# Patient Record
Sex: FEMALE | Race: WHITE | NOT HISPANIC OR LATINO | Employment: FULL TIME | URBAN - METROPOLITAN AREA
[De-identification: names, ages, dates, MRNs, and addresses within clinical notes are randomized per-mention and may not be internally consistent; named-entity substitution may affect disease eponyms.]

---

## 2019-01-23 ENCOUNTER — OFFICE VISIT CONVERTED (OUTPATIENT)
Dept: ORTHOPEDIC SURGERY | Facility: CLINIC | Age: 24
End: 2019-01-23
Attending: ORTHOPAEDIC SURGERY

## 2019-05-25 ENCOUNTER — HOSPITAL ENCOUNTER (OUTPATIENT)
Dept: URGENT CARE | Facility: CLINIC | Age: 24
Discharge: HOME | End: 2019-05-25
Attending: NURSE PRACTITIONER

## 2019-07-11 ENCOUNTER — HOSPITAL ENCOUNTER (OUTPATIENT)
Dept: OTHER | Facility: HOSPITAL | Age: 24
Discharge: HOME | End: 2019-07-11
Attending: NURSE PRACTITIONER

## 2019-07-14 LAB
AMOXICILLIN+CLAV SUSC ISLT: 16
AMPICILLIN SUSC ISLT: >=32
AMPICILLIN+SULBAC SUSC ISLT: >=32
BACTERIA UR CULT: ABNORMAL
CEFAZOLIN SUSC ISLT: <=4
CEFEPIME SUSC ISLT: <=1
CEFTAZIDIME SUSC ISLT: <=1
CEFTRIAXONE SUSC ISLT: <=1
CEFUROXIME ORAL SUSC ISLT: 4
CEFUROXIME PARENTER SUSC ISLT: 4
CIPROFLOXACIN SUSC ISLT: <=0.25
ERTAPENEM SUSC ISLT: <=0.5
GENTAMICIN SUSC ISLT: >=16
LEVOFLOXACIN SUSC ISLT: <=0.12
NITROFURANTOIN SUSC ISLT: <=16
TETRACYCLINE SUSC ISLT: <=1
TMP SMX SUSC ISLT: >=320
TOBRAMYCIN SUSC ISLT: 4

## 2019-11-08 ENCOUNTER — HOSPITAL ENCOUNTER (OUTPATIENT)
Dept: OTHER | Facility: HOSPITAL | Age: 24
Discharge: HOME | End: 2019-11-08
Attending: OBSTETRICS & GYNECOLOGY

## 2019-11-08 LAB
AMYLASE SERPL-CCNC: 61 U/L (ref 30–110)
LIPASE SERPL-CCNC: 22 U/L (ref 5–51)

## 2020-03-13 ENCOUNTER — HOSPITAL ENCOUNTER (OUTPATIENT)
Dept: URGENT CARE | Facility: CLINIC | Age: 25
Discharge: HOME | End: 2020-03-13
Attending: FAMILY MEDICINE

## 2020-03-15 LAB — BACTERIA SPEC AEROBE CULT: NORMAL

## 2020-06-26 ENCOUNTER — HOSPITAL ENCOUNTER (OUTPATIENT)
Dept: OTHER | Facility: HOSPITAL | Age: 25
Discharge: HOME | End: 2020-06-26
Attending: NURSE PRACTITIONER

## 2020-06-28 LAB
BACTERIA SPEC AEROBE CULT: NORMAL
BACTERIA SPEC ANAEROBE CULT: NORMAL

## 2020-10-26 ENCOUNTER — OFFICE VISIT (OUTPATIENT)
Dept: GASTROENTEROLOGY | Facility: CLINIC | Age: 25
End: 2020-10-26

## 2020-10-26 VITALS
BODY MASS INDEX: 25.33 KG/M2 | DIASTOLIC BLOOD PRESSURE: 88 MMHG | OXYGEN SATURATION: 99 % | WEIGHT: 152 LBS | HEIGHT: 65 IN | HEART RATE: 77 BPM | SYSTOLIC BLOOD PRESSURE: 130 MMHG | TEMPERATURE: 97.5 F

## 2020-10-26 DIAGNOSIS — R19.7 DIARRHEA, UNSPECIFIED TYPE: ICD-10-CM

## 2020-10-26 DIAGNOSIS — K51.00 ULCERATIVE PANCOLITIS WITHOUT COMPLICATION (HCC): Primary | ICD-10-CM

## 2020-10-26 DIAGNOSIS — R10.84 GENERALIZED ABDOMINAL PAIN: ICD-10-CM

## 2020-10-26 PROCEDURE — 99244 OFF/OP CNSLTJ NEW/EST MOD 40: CPT | Performed by: INTERNAL MEDICINE

## 2020-10-26 RX ORDER — BUSPIRONE HYDROCHLORIDE 15 MG/1
15 TABLET ORAL DAILY
COMMUNITY
Start: 2020-09-07

## 2020-10-26 RX ORDER — CITALOPRAM 40 MG/1
40 TABLET ORAL DAILY
COMMUNITY
Start: 2020-08-26

## 2020-10-26 NOTE — PATIENT INSTRUCTIONS
Obtain copy of records from Dr. Akins.    Check lab work today.    Schedule CT enterography for further evaluation of symptoms.    Further recommendations to be made regarding treatment pending review of records and results of above.

## 2020-10-26 NOTE — PROGRESS NOTES
Ulcerative Colitis (poss Crohns ) and Establish Care      HPI  Patient is a 25 year old female who presents today for evaluation.    Patient presents today for a second opinion. She has been following with Dr. Akins.    She has a history of ulcerative colitis. Prior treatment included Humira, Remicade, and sulfasalazine.    Patient reports she was diagnosed January 2012 during pregnancy. She had a colonoscopy during her pregnancy and reports her disease was severe. She was initially treated with Remicade which she reports worked very well for her. It was discontinued due to an insurance issue. She then took sulfasalazine which did not help. She then started Humira which she reports helped for a period of time but after around a year it was no longer working. She had also previously been on prednisone and 6MP.    She reports she had a colonoscopy performed over the summer 2020 as part of a clinical trial and planned to start a clinical trial drug. She reports when the colonoscopy was performed, she was told she had Crohn's disease. She had been told her colon looked better but there was now inflammation in the small intestine. She did not start the clinical trial as she was told it was not bad enough. Patient was told she could stay on Humira, but patient did not want to continue this therapy as it did not seem to be helping. She reports alternative therapy was not discussed.    Currently, she is on no medications. She reports she has not been on therapy for around 1 year.    Patient reports her symptoms come and go. She reports increased stool frequency with at least 5 bowel movements per day. Reports mucus in her stools. Reports abdominal pain that radiates to her back.     Review of Systems   Constitutional: Negative for appetite change, chills, diaphoresis, fatigue, fever and unexpected weight change.   HENT: Negative for dental problem, ear pain, mouth sores, rhinorrhea, sore throat and voice change.     Eyes: Negative for pain, redness and visual disturbance.   Respiratory: Negative for cough, chest tightness and wheezing.    Cardiovascular: Negative for chest pain, palpitations and leg swelling.   Endocrine: Negative for cold intolerance, heat intolerance, polydipsia, polyphagia and polyuria.   Genitourinary: Negative for dysuria, frequency, hematuria and urgency.   Musculoskeletal: Positive for arthralgias, back pain, joint swelling, myalgias and neck pain.   Skin: Negative for rash.   Allergic/Immunologic: Negative for environmental allergies, food allergies and immunocompromised state.   Neurological: Negative for dizziness, seizures, weakness, numbness and headaches.   Hematological: Does not bruise/bleed easily.   Psychiatric/Behavioral: Positive for sleep disturbance. The patient is nervous/anxious.         I have reviewed and confirmed the accuracy of the HPI and ROS as documented by the APRN JUANITA Quinteros     Problem List:  There is no problem list on file for this patient.      Medical History:    Past Medical History:   Diagnosis Date   • Anxiety and depression         Social History:    Social History     Socioeconomic History   • Marital status:      Spouse name: Not on file   • Number of children: Not on file   • Years of education: Not on file   • Highest education level: Not on file   Tobacco Use   • Smoking status: Former Smoker   • Smokeless tobacco: Never Used   Substance and Sexual Activity   • Alcohol use: Yes   • Drug use: Never   • Sexual activity: Defer       Family History:   Family History   Problem Relation Age of Onset   • Fibromyalgia Mother    • Seizures Mother    • Diabetes Mother    • Hyperlipidemia Mother        Surgical History:   Past Surgical History:   Procedure Laterality Date   •  SECTION     • KNEE SURGERY Left          Current Outpatient Medications:   •  busPIRone (BUSPAR) 15 MG tablet, Take 15 mg by mouth Daily., Disp: , Rfl:   •  citalopram  (CeleXA) 40 MG tablet, Take 40 mg by mouth Daily., Disp: , Rfl:     Allergies: No Known Allergies     The following portions of the patient's history were reviewed and updated as appropriate: allergies, current medications, past family history, past medical history, past social history, past surgical history and problem list.    Vitals:    10/26/20 1317   BP: 130/88   Pulse: 77   Temp: 97.5 °F (36.4 °C)   SpO2: 99%         10/26/20  1317   Weight: 68.9 kg (152 lb)     Body mass index is 25.29 kg/m².      PHYSICAL EXAM:  Physical Exam  Vitals signs reviewed.   Constitutional:       Appearance: Normal appearance.   HENT:      Nose: No nasal deformity.   Eyes:      General: No scleral icterus.  Neck:      Comments: Trachea midline.  Cardiovascular:      Rate and Rhythm: Normal rate and regular rhythm.   Pulmonary:      Effort: No respiratory distress.      Breath sounds: Normal breath sounds.   Abdominal:      General: Bowel sounds are normal. There is no distension.      Palpations: Abdomen is soft. There is no mass.      Tenderness: There is no abdominal tenderness.      Comments: Mild epigastric tenderness   Lymphadenopathy:      Comments: No periumbilical lymphadenopathy.   Skin:     General: Skin is warm.   Neurological:      Mental Status: She is alert.           Assessment/ Plan  Diagnoses and all orders for this visit:    1. Ulcerative pancolitis without complication (CMS/HCC) (Primary)    2. Generalized abdominal pain    3. Diarrhea, unspecified type         No follow-ups on file.    Patient Instructions   Obtain copy of records from Dr. Akins.    Check lab work today.    Schedule CT enterography for further evaluation of symptoms.    Further recommendations to be made regarding treatment pending review of records and results of above.        Discussion:  Patient is current self-pay. Discussed with her today that I would recommend lab work, CT scan, possible updated endoscopic evaluation, and therapy for  inflammatory bowel disease. She has recently found a job and hopes to have insurance soon, she may elect to delay our workup until she has insurance again.    Once records have been reviewed and imaging have been completed, will provide further recommendations, to consider treatment with Stelara.    Documentation by Grecia GRANT acting as a scribe in the following sections on behalf of the billable provider: HPI, ROS, assessment, & plan.

## 2020-11-19 ENCOUNTER — TELEPHONE (OUTPATIENT)
Dept: GASTROENTEROLOGY | Facility: CLINIC | Age: 25
End: 2020-11-19

## 2020-11-19 RX ORDER — METHYLPREDNISOLONE 4 MG/1
TABLET ORAL
Qty: 1 EACH | Refills: 0 | Status: SHIPPED | OUTPATIENT
Start: 2020-11-19 | End: 2021-01-12 | Stop reason: SDUPTHER

## 2021-01-12 DIAGNOSIS — K51.00 ULCERATIVE PANCOLITIS WITHOUT COMPLICATION (HCC): Primary | ICD-10-CM

## 2021-01-12 RX ORDER — METHYLPREDNISOLONE 4 MG/1
TABLET ORAL
Qty: 1 EACH | Refills: 0 | Status: SHIPPED | OUTPATIENT
Start: 2021-01-12 | End: 2021-01-30 | Stop reason: HOSPADM

## 2021-01-12 NOTE — TELEPHONE ENCOUNTER
Patient called stating that she is having a flare up. She states that this has been going on for two days. She states that she has been going to the restroom non stop. She states that she is having abdominal pain. She states that her stool is all blood and mucus. She states that she took pictures to show this. She states that she tried taking the Tylenol to help with the abdominal pain. She states that she has taking Imodium but that hasn't helped.   Tiffany

## 2021-01-12 NOTE — TELEPHONE ENCOUNTER
Spoke with patient via telephone.  She has been having blood per rectum for the past 4 weeks.  She has had worsening pain and increased frequency.  We will start Medrol Dosepak.  She does not have insurance and would like to pay cash for procedures.  Will discuss with Dr. Hull the priority of procedures to help minimize out-of-pocket financial cost and contact patient with further recommendations.  Marcello

## 2021-01-13 ENCOUNTER — PREP FOR SURGERY (OUTPATIENT)
Dept: GASTROENTEROLOGY | Facility: CLINIC | Age: 26
End: 2021-01-13

## 2021-01-13 DIAGNOSIS — K51.00 ULCERATIVE PANCOLITIS WITHOUT COMPLICATION (HCC): Primary | ICD-10-CM

## 2021-01-13 DIAGNOSIS — R10.84 GENERALIZED ABDOMINAL PAIN: ICD-10-CM

## 2021-01-13 DIAGNOSIS — K62.5 RECTAL BLEEDING: ICD-10-CM

## 2021-01-13 NOTE — TELEPHONE ENCOUNTER
Brian,   I reviewed with Dr Chowdhury.   Given how poorly she is feeling and the cost of her work up, he recommends follow up with GI clinic at  to get her feeling better and due to lack of insurance   ALSO she should go to the ER at  if symptoms are unbearable as I discussed with her last night.   Please discuss with patient and let me know if we need help arranging GI appt with .   Marcello

## 2021-01-13 NOTE — TELEPHONE ENCOUNTER
Dr. Chowdhury, this was a new patient with you October 2020.    Please see message below.    Patient does not have insurance and will cash pay for procedures or testing or imaging.    She would like to know which test you want as she is not doing well from a GI standpoint.  Please advise.  Marcello

## 2021-01-14 ENCOUNTER — TELEPHONE (OUTPATIENT)
Dept: GASTROENTEROLOGY | Facility: CLINIC | Age: 26
End: 2021-01-14

## 2021-01-14 RX ORDER — PREDNISONE 10 MG/1
TABLET ORAL
Qty: 84 TABLET | Refills: 0 | Status: SHIPPED | OUTPATIENT
Start: 2021-01-14 | End: 2021-01-30 | Stop reason: HOSPADM

## 2021-01-23 ENCOUNTER — TELEPHONE (OUTPATIENT)
Dept: GASTROENTEROLOGY | Facility: CLINIC | Age: 26
End: 2021-01-23

## 2021-01-23 NOTE — TELEPHONE ENCOUNTER
Called into answering service.     Tapering steroid course currently.    Says having frequent BMs, blood, pain.     Advised to ER for flare failing oral steroids.    She understands the plan and will do so

## 2021-01-25 NOTE — TELEPHONE ENCOUNTER
Discussed with Dr Chowdhury, please work in to his schedule January 26, 2021 at 1130 for work in appt with Dr Chowdhury. SW

## 2021-01-26 ENCOUNTER — HOSPITAL ENCOUNTER (INPATIENT)
Facility: HOSPITAL | Age: 26
LOS: 4 days | Discharge: HOME | End: 2021-01-30
Attending: HOSPITALIST | Admitting: HOSPITALIST

## 2021-01-26 ENCOUNTER — OFFICE VISIT (OUTPATIENT)
Dept: GASTROENTEROLOGY | Facility: CLINIC | Age: 26
End: 2021-01-26

## 2021-01-26 VITALS
HEART RATE: 75 BPM | OXYGEN SATURATION: 95 % | TEMPERATURE: 97.7 F | HEIGHT: 65 IN | WEIGHT: 143.6 LBS | DIASTOLIC BLOOD PRESSURE: 80 MMHG | BODY MASS INDEX: 23.93 KG/M2 | SYSTOLIC BLOOD PRESSURE: 122 MMHG

## 2021-01-26 DIAGNOSIS — K62.5 BLOOD PER RECTUM: ICD-10-CM

## 2021-01-26 DIAGNOSIS — K51.00 ULCERATIVE PANCOLITIS WITHOUT COMPLICATION (HCC): Primary | ICD-10-CM

## 2021-01-26 DIAGNOSIS — R10.84 GENERALIZED ABDOMINAL PAIN: ICD-10-CM

## 2021-01-26 DIAGNOSIS — Z92.241 HISTORY OF RECENT STEROID USE: ICD-10-CM

## 2021-01-26 DIAGNOSIS — R10.84 GENERALIZED ABDOMINAL PAIN: Primary | ICD-10-CM

## 2021-01-26 DIAGNOSIS — K52.9 INFLAMMATORY BOWEL DISEASE: ICD-10-CM

## 2021-01-26 PROBLEM — E87.6 HYPOKALEMIA: Status: ACTIVE | Noted: 2021-01-26

## 2021-01-26 PROBLEM — K50.90 INFLAMMATORY BOWEL DISEASE (CROHN'S DISEASE): Status: ACTIVE | Noted: 2021-01-26

## 2021-01-26 PROBLEM — E61.1 IRON DEFICIENCY: Status: ACTIVE | Noted: 2021-01-26

## 2021-01-26 LAB
ALBUMIN SERPL-MCNC: 4.1 G/DL (ref 3.5–5.2)
ALBUMIN/GLOB SERPL: 1.6 G/DL
ALP SERPL-CCNC: 40 U/L (ref 39–117)
ALT SERPL W P-5'-P-CCNC: 11 U/L (ref 1–33)
ANION GAP SERPL CALCULATED.3IONS-SCNC: 13.4 MMOL/L (ref 5–15)
AST SERPL-CCNC: 12 U/L (ref 1–32)
B-HCG UR QL: NEGATIVE
BASOPHILS # BLD AUTO: 0.04 10*3/MM3 (ref 0–0.2)
BASOPHILS NFR BLD AUTO: 0.4 % (ref 0–1.5)
BILIRUB SERPL-MCNC: 0.2 MG/DL (ref 0–1.2)
BUN SERPL-MCNC: 7 MG/DL (ref 6–20)
BUN/CREAT SERPL: 9.5 (ref 7–25)
CALCIUM SPEC-SCNC: 8.9 MG/DL (ref 8.6–10.5)
CHLORIDE SERPL-SCNC: 105 MMOL/L (ref 98–107)
CO2 SERPL-SCNC: 23.6 MMOL/L (ref 22–29)
CREAT SERPL-MCNC: 0.74 MG/DL (ref 0.57–1)
DEPRECATED RDW RBC AUTO: 42 FL (ref 37–54)
EOSINOPHIL # BLD AUTO: 0.04 10*3/MM3 (ref 0–0.4)
EOSINOPHIL NFR BLD AUTO: 0.4 % (ref 0.3–6.2)
ERYTHROCYTE [DISTWIDTH] IN BLOOD BY AUTOMATED COUNT: 12.4 % (ref 12.3–15.4)
GFR SERPL CREATININE-BSD FRML MDRD: 96 ML/MIN/1.73
GLOBULIN UR ELPH-MCNC: 2.6 GM/DL
GLUCOSE SERPL-MCNC: 120 MG/DL (ref 65–99)
HCT VFR BLD AUTO: 39.6 % (ref 34–46.6)
HGB BLD-MCNC: 13.8 G/DL (ref 12–15.9)
IMM GRANULOCYTES # BLD AUTO: 0.08 10*3/MM3 (ref 0–0.05)
IMM GRANULOCYTES NFR BLD AUTO: 0.8 % (ref 0–0.5)
INR PPP: 0.98 (ref 0.9–1.1)
IRON 24H UR-MRATE: 54 MCG/DL (ref 37–145)
IRON SATN MFR SERPL: 18 % (ref 20–50)
LYMPHOCYTES # BLD AUTO: 3.07 10*3/MM3 (ref 0.7–3.1)
LYMPHOCYTES NFR BLD AUTO: 29.3 % (ref 19.6–45.3)
MCH RBC QN AUTO: 32.5 PG (ref 26.6–33)
MCHC RBC AUTO-ENTMCNC: 34.8 G/DL (ref 31.5–35.7)
MCV RBC AUTO: 93.4 FL (ref 79–97)
MONOCYTES # BLD AUTO: 0.63 10*3/MM3 (ref 0.1–0.9)
MONOCYTES NFR BLD AUTO: 6 % (ref 5–12)
NEUTROPHILS NFR BLD AUTO: 6.62 10*3/MM3 (ref 1.7–7)
NEUTROPHILS NFR BLD AUTO: 63.1 % (ref 42.7–76)
NRBC BLD AUTO-RTO: 0 /100 WBC (ref 0–0.2)
PLATELET # BLD AUTO: 241 10*3/MM3 (ref 140–450)
PMV BLD AUTO: 10.8 FL (ref 6–12)
POTASSIUM SERPL-SCNC: 3.1 MMOL/L (ref 3.5–5.2)
PROT SERPL-MCNC: 6.7 G/DL (ref 6–8.5)
PROTHROMBIN TIME: 12.8 SECONDS (ref 11.7–14.2)
RBC # BLD AUTO: 4.24 10*6/MM3 (ref 3.77–5.28)
SODIUM SERPL-SCNC: 142 MMOL/L (ref 136–145)
TIBC SERPL-MCNC: 294 MCG/DL (ref 298–536)
TRANSFERRIN SERPL-MCNC: 197 MG/DL (ref 200–360)
WBC # BLD AUTO: 10.48 10*3/MM3 (ref 3.4–10.8)

## 2021-01-26 PROCEDURE — 85025 COMPLETE CBC W/AUTO DIFF WBC: CPT | Performed by: HOSPITALIST

## 2021-01-26 PROCEDURE — 84466 ASSAY OF TRANSFERRIN: CPT | Performed by: HOSPITALIST

## 2021-01-26 PROCEDURE — 83540 ASSAY OF IRON: CPT | Performed by: HOSPITALIST

## 2021-01-26 PROCEDURE — 81025 URINE PREGNANCY TEST: CPT | Performed by: HOSPITALIST

## 2021-01-26 PROCEDURE — G0378 HOSPITAL OBSERVATION PER HR: HCPCS

## 2021-01-26 PROCEDURE — U0004 COV-19 TEST NON-CDC HGH THRU: HCPCS | Performed by: HOSPITALIST

## 2021-01-26 PROCEDURE — 25010000002 ONDANSETRON PER 1 MG: Performed by: HOSPITALIST

## 2021-01-26 PROCEDURE — 25010000002 ONDANSETRON PER 1 MG: Performed by: INTERNAL MEDICINE

## 2021-01-26 PROCEDURE — 85610 PROTHROMBIN TIME: CPT | Performed by: HOSPITALIST

## 2021-01-26 PROCEDURE — 80053 COMPREHEN METABOLIC PANEL: CPT | Performed by: HOSPITALIST

## 2021-01-26 PROCEDURE — 99214 OFFICE O/P EST MOD 30 MIN: CPT | Performed by: INTERNAL MEDICINE

## 2021-01-26 RX ORDER — HYDROCODONE BITARTRATE AND ACETAMINOPHEN 5; 325 MG/1; MG/1
1 TABLET ORAL EVERY 6 HOURS PRN
Status: DISCONTINUED | OUTPATIENT
Start: 2021-01-26 | End: 2021-01-26

## 2021-01-26 RX ORDER — ONDANSETRON 4 MG/1
4 TABLET, FILM COATED ORAL EVERY 6 HOURS PRN
Status: DISCONTINUED | OUTPATIENT
Start: 2021-01-26 | End: 2021-01-30 | Stop reason: HOSPADM

## 2021-01-26 RX ORDER — POTASSIUM CHLORIDE 750 MG/1
40 TABLET, FILM COATED, EXTENDED RELEASE ORAL AS NEEDED
Status: DISCONTINUED | OUTPATIENT
Start: 2021-01-26 | End: 2021-01-30 | Stop reason: HOSPADM

## 2021-01-26 RX ORDER — MULTIPLE VITAMINS W/ MINERALS TAB 9MG-400MCG
1 TAB ORAL DAILY
COMMUNITY

## 2021-01-26 RX ORDER — CHOLECALCIFEROL (VITAMIN D3) 125 MCG
1000 CAPSULE ORAL DAILY
Status: DISCONTINUED | OUTPATIENT
Start: 2021-01-27 | End: 2021-01-30 | Stop reason: HOSPADM

## 2021-01-26 RX ORDER — ACETAMINOPHEN 650 MG/1
650 SUPPOSITORY RECTAL EVERY 4 HOURS PRN
Status: DISCONTINUED | OUTPATIENT
Start: 2021-01-26 | End: 2021-01-30 | Stop reason: HOSPADM

## 2021-01-26 RX ORDER — SODIUM CHLORIDE 0.9 % (FLUSH) 0.9 %
10 SYRINGE (ML) INJECTION EVERY 12 HOURS SCHEDULED
Status: DISCONTINUED | OUTPATIENT
Start: 2021-01-26 | End: 2021-01-30 | Stop reason: HOSPADM

## 2021-01-26 RX ORDER — ACETAMINOPHEN 160 MG/5ML
650 SOLUTION ORAL EVERY 4 HOURS PRN
Status: DISCONTINUED | OUTPATIENT
Start: 2021-01-26 | End: 2021-01-30 | Stop reason: HOSPADM

## 2021-01-26 RX ORDER — CITALOPRAM 40 MG/1
40 TABLET ORAL DAILY
Status: DISCONTINUED | OUTPATIENT
Start: 2021-01-27 | End: 2021-01-30 | Stop reason: HOSPADM

## 2021-01-26 RX ORDER — ACETAMINOPHEN 325 MG/1
650 TABLET ORAL EVERY 4 HOURS PRN
Status: DISCONTINUED | OUTPATIENT
Start: 2021-01-26 | End: 2021-01-30 | Stop reason: HOSPADM

## 2021-01-26 RX ORDER — ONDANSETRON 2 MG/ML
4 INJECTION INTRAMUSCULAR; INTRAVENOUS EVERY 6 HOURS PRN
Status: DISCONTINUED | OUTPATIENT
Start: 2021-01-26 | End: 2021-01-30 | Stop reason: HOSPADM

## 2021-01-26 RX ORDER — POTASSIUM CHLORIDE 1.5 G/1.77G
40 POWDER, FOR SOLUTION ORAL AS NEEDED
Status: DISCONTINUED | OUTPATIENT
Start: 2021-01-26 | End: 2021-01-30 | Stop reason: HOSPADM

## 2021-01-26 RX ORDER — CYANOCOBALAMIN (VITAMIN B-12) 1000 MCG
TABLET ORAL
COMMUNITY

## 2021-01-26 RX ORDER — MULTIPLE VITAMINS W/ MINERALS TAB 9MG-400MCG
1 TAB ORAL DAILY
Status: DISCONTINUED | OUTPATIENT
Start: 2021-01-27 | End: 2021-01-30 | Stop reason: HOSPADM

## 2021-01-26 RX ORDER — PREDNISONE 20 MG/1
40 TABLET ORAL
Status: DISCONTINUED | OUTPATIENT
Start: 2021-01-27 | End: 2021-01-27

## 2021-01-26 RX ORDER — SODIUM CHLORIDE 0.9 % (FLUSH) 0.9 %
10 SYRINGE (ML) INJECTION AS NEEDED
Status: DISCONTINUED | OUTPATIENT
Start: 2021-01-26 | End: 2021-01-30 | Stop reason: HOSPADM

## 2021-01-26 RX ORDER — GLUCOSAMINE/MSM/CHONDROIT SULF 500-166.6
TABLET ORAL
COMMUNITY

## 2021-01-26 RX ADMIN — ACETAMINOPHEN 650 MG: 325 TABLET, FILM COATED ORAL at 22:42

## 2021-01-26 RX ADMIN — HYDROCODONE BITARTRATE AND ACETAMINOPHEN 1 TABLET: 5; 325 TABLET ORAL at 16:24

## 2021-01-26 RX ADMIN — ONDANSETRON 4 MG: 2 INJECTION INTRAMUSCULAR; INTRAVENOUS at 22:42

## 2021-01-26 RX ADMIN — POTASSIUM CHLORIDE 40 MEQ: 750 TABLET, EXTENDED RELEASE ORAL at 18:44

## 2021-01-26 RX ADMIN — POTASSIUM CHLORIDE 40 MEQ: 750 TABLET, EXTENDED RELEASE ORAL at 23:48

## 2021-01-26 RX ADMIN — SODIUM CHLORIDE, PRESERVATIVE FREE 10 ML: 5 INJECTION INTRAVENOUS at 22:54

## 2021-01-26 NOTE — PROGRESS NOTES
No chief complaint on file.  Abdominal pain bleeding and diarrhea          History of Present Illness    Follow-up for worsening symptoms.  Previous treatment for her pan ulcerative colitis included Humira Remicade and sulfasalazine diagnosed during her pregnancy in 2012 severe disease treated with Remicade which initially worked for her discontinued due to insurance took sulfasalazine which was ineffective used Humira which helped for a brief period also been on prednisone and 6-MP summer 2020 had a colonoscopy as part of a planned clinical trial she was told that her inflammation looked more like Crohn's disease improved colonic view but small bowel involvement clinical trial was not started recommended to continue Humira at the time of her consultation with us initially in October 2020 she was on no medications for about 1 year her symptoms have progressively worsened since that time we have been trying to get her worked up with endoscopic evaluation and imaging but due to her finances this has become somewhat problematic although the patient is trying very hard to have these done and she did have a CT enterography ordered but not performed.  She has not wanted to go to the hospital when we thought she needed to although she recently required prednisone taper initiated by our office and then over the weekend it appears she called the on-call physician and was instructed to go to the emergency room due to increasing symptoms upon tapering of her prednisone.  She has worsened with her symptoms having 6-8 bloody bowel movements with clots now associated with some of vomiting also some abdominal pain which has not improved with the prednisone taper.  She feels fatigued and rundown she is not running a fever  Review of Systems fatigue     Result Review :      Hepatic Function Panel (10/26/2020 13:39)  CBC & Differential (10/26/2020 13:39)  SCANNED PATHOLOGY (11/27/2019)  SCANNED PATHOLOGY (02/10/2016)      Vital  Signs:   There were no vitals taken for this visit.    There is no height or weight on file to calculate BMI.     Physical Exam  Vitals signs reviewed.   Constitutional:       Appearance: Normal appearance. She is ill-appearing.      Comments: Pallor   HENT:      Nose: No nasal deformity.   Eyes:      General: No scleral icterus.  Neck:      Comments: Trachea midline.  Cardiovascular:      Rate and Rhythm: Normal rate and regular rhythm.   Pulmonary:      Effort: No respiratory distress.      Breath sounds: Normal breath sounds.   Abdominal:      General: Bowel sounds are normal. There is no distension.      Palpations: Abdomen is soft. There is no mass.      Tenderness: There is abdominal tenderness.   Lymphadenopathy:      Comments: No periumbilical lymphadenopathy.   Skin:     General: Skin is warm.   Neurological:      Mental Status: She is alert.             Assessment and Plan      Diagnoses and all orders for this visit:    1. Ulcerative pancolitis without complication (CMS/HCC) (Primary)    2. Generalized abdominal pain    3. History of recent steroid use                  We have been somewhat limited in our ability to treat her as she has been reluctant to be admitted to the hospital upon our recommendations and has been limited in her finances.  She is very agreeable at trying to self pay for what ever it is she needs however this could be extremely expensive given her needs for endoscopic evaluation and likely biologic therapy.  Because of her progressive symptoms she is willing to be admitted.  I believe this will get her better the fastest she requires fluids imaging lab work and probably IV steroids.  She will likely need EGD and colonoscopy and we will need to come up with a plan for treatment based on findings and what she is able to afford or have offered to her.  Plans being made currently for admission trying to figure out whether she will be admitted to [facility] or to [place]    Follow Up   No  follow-ups on file.     There are no Patient Instructions on file for this visit.

## 2021-01-27 ENCOUNTER — APPOINTMENT (OUTPATIENT)
Dept: ULTRASOUND IMAGING | Facility: HOSPITAL | Age: 26
End: 2021-01-27

## 2021-01-27 ENCOUNTER — APPOINTMENT (OUTPATIENT)
Dept: CT IMAGING | Facility: HOSPITAL | Age: 26
End: 2021-01-27

## 2021-01-27 LAB
ANION GAP SERPL CALCULATED.3IONS-SCNC: 8.1 MMOL/L (ref 5–15)
BASOPHILS # BLD AUTO: 0.05 10*3/MM3 (ref 0–0.2)
BASOPHILS NFR BLD AUTO: 0.7 % (ref 0–1.5)
BUN SERPL-MCNC: 6 MG/DL (ref 6–20)
BUN/CREAT SERPL: 8.5 (ref 7–25)
CALCIUM SPEC-SCNC: 8.9 MG/DL (ref 8.6–10.5)
CHLORIDE SERPL-SCNC: 107 MMOL/L (ref 98–107)
CO2 SERPL-SCNC: 23.9 MMOL/L (ref 22–29)
CREAT SERPL-MCNC: 0.71 MG/DL (ref 0.57–1)
CRP SERPL-MCNC: <0.3 MG/DL (ref 0–0.5)
DEPRECATED RDW RBC AUTO: 43.1 FL (ref 37–54)
EOSINOPHIL # BLD AUTO: 0.09 10*3/MM3 (ref 0–0.4)
EOSINOPHIL NFR BLD AUTO: 1.3 % (ref 0.3–6.2)
ERYTHROCYTE [DISTWIDTH] IN BLOOD BY AUTOMATED COUNT: 12.6 % (ref 12.3–15.4)
GFR SERPL CREATININE-BSD FRML MDRD: 100 ML/MIN/1.73
GLUCOSE SERPL-MCNC: 80 MG/DL (ref 65–99)
HCT VFR BLD AUTO: 40.4 % (ref 34–46.6)
HGB BLD-MCNC: 13.6 G/DL (ref 12–15.9)
IMM GRANULOCYTES # BLD AUTO: 0.04 10*3/MM3 (ref 0–0.05)
IMM GRANULOCYTES NFR BLD AUTO: 0.6 % (ref 0–0.5)
LYMPHOCYTES # BLD AUTO: 3.23 10*3/MM3 (ref 0.7–3.1)
LYMPHOCYTES NFR BLD AUTO: 46.7 % (ref 19.6–45.3)
MCH RBC QN AUTO: 31.9 PG (ref 26.6–33)
MCHC RBC AUTO-ENTMCNC: 33.7 G/DL (ref 31.5–35.7)
MCV RBC AUTO: 94.8 FL (ref 79–97)
MONOCYTES # BLD AUTO: 0.38 10*3/MM3 (ref 0.1–0.9)
MONOCYTES NFR BLD AUTO: 5.5 % (ref 5–12)
NEUTROPHILS NFR BLD AUTO: 3.12 10*3/MM3 (ref 1.7–7)
NEUTROPHILS NFR BLD AUTO: 45.2 % (ref 42.7–76)
NRBC BLD AUTO-RTO: 0 /100 WBC (ref 0–0.2)
PLATELET # BLD AUTO: 224 10*3/MM3 (ref 140–450)
PMV BLD AUTO: 10.8 FL (ref 6–12)
POTASSIUM SERPL-SCNC: 4.4 MMOL/L (ref 3.5–5.2)
POTASSIUM SERPL-SCNC: 4.4 MMOL/L (ref 3.5–5.2)
RBC # BLD AUTO: 4.26 10*6/MM3 (ref 3.77–5.28)
SARS-COV-2 RNA RESP QL NAA+PROBE: NOT DETECTED
SODIUM SERPL-SCNC: 139 MMOL/L (ref 136–145)
WBC # BLD AUTO: 6.91 10*3/MM3 (ref 3.4–10.8)

## 2021-01-27 PROCEDURE — 84132 ASSAY OF SERUM POTASSIUM: CPT | Performed by: HOSPITALIST

## 2021-01-27 PROCEDURE — G0008 ADMIN INFLUENZA VIRUS VAC: HCPCS | Performed by: HOSPITALIST

## 2021-01-27 PROCEDURE — 86140 C-REACTIVE PROTEIN: CPT | Performed by: INTERNAL MEDICINE

## 2021-01-27 PROCEDURE — 25010000002 INFLUENZA VAC SPLIT QUAD 0.5 ML SUSPENSION PREFILLED SYRINGE: Performed by: HOSPITALIST

## 2021-01-27 PROCEDURE — 25010000002 IOPAMIDOL 61 % SOLUTION: Performed by: HOSPITALIST

## 2021-01-27 PROCEDURE — 25010000002 METHYLPREDNISOLONE PER 125 MG: Performed by: INTERNAL MEDICINE

## 2021-01-27 PROCEDURE — 80048 BASIC METABOLIC PNL TOTAL CA: CPT | Performed by: HOSPITALIST

## 2021-01-27 PROCEDURE — 76705 ECHO EXAM OF ABDOMEN: CPT

## 2021-01-27 PROCEDURE — 90686 IIV4 VACC NO PRSV 0.5 ML IM: CPT | Performed by: HOSPITALIST

## 2021-01-27 PROCEDURE — 85025 COMPLETE CBC W/AUTO DIFF WBC: CPT | Performed by: HOSPITALIST

## 2021-01-27 PROCEDURE — 99254 IP/OBS CNSLTJ NEW/EST MOD 60: CPT | Performed by: INTERNAL MEDICINE

## 2021-01-27 PROCEDURE — 74177 CT ABD & PELVIS W/CONTRAST: CPT

## 2021-01-27 RX ORDER — HYDROCODONE BITARTRATE AND ACETAMINOPHEN 5; 325 MG/1; MG/1
1 TABLET ORAL ONCE AS NEEDED
Status: COMPLETED | OUTPATIENT
Start: 2021-01-27 | End: 2021-01-27

## 2021-01-27 RX ORDER — SODIUM CHLORIDE 9 MG/ML
100 INJECTION, SOLUTION INTRAVENOUS CONTINUOUS
Status: ACTIVE | OUTPATIENT
Start: 2021-01-27 | End: 2021-01-28

## 2021-01-27 RX ORDER — METHYLPREDNISOLONE SODIUM SUCCINATE 125 MG/2ML
60 INJECTION, POWDER, LYOPHILIZED, FOR SOLUTION INTRAMUSCULAR; INTRAVENOUS DAILY
Status: DISCONTINUED | OUTPATIENT
Start: 2021-01-27 | End: 2021-01-30 | Stop reason: HOSPADM

## 2021-01-27 RX ADMIN — POTASSIUM CHLORIDE 40 MEQ: 750 TABLET, EXTENDED RELEASE ORAL at 03:50

## 2021-01-27 RX ADMIN — ACETAMINOPHEN 650 MG: 325 TABLET, FILM COATED ORAL at 09:20

## 2021-01-27 RX ADMIN — INFLUENZA VIRUS VACCINE 0.5 ML: 15; 15; 15; 15 SUSPENSION INTRAMUSCULAR at 12:19

## 2021-01-27 RX ADMIN — SODIUM CHLORIDE, PRESERVATIVE FREE 10 ML: 5 INJECTION INTRAVENOUS at 09:18

## 2021-01-27 RX ADMIN — METHYLPREDNISOLONE SODIUM SUCCINATE 60 MG: 125 INJECTION, POWDER, FOR SOLUTION INTRAMUSCULAR; INTRAVENOUS at 10:37

## 2021-01-27 RX ADMIN — SODIUM CHLORIDE 100 ML/HR: 9 INJECTION, SOLUTION INTRAVENOUS at 12:22

## 2021-01-27 RX ADMIN — CITALOPRAM 40 MG: 40 TABLET, FILM COATED ORAL at 09:18

## 2021-01-27 RX ADMIN — Medication 1000 MCG: at 09:18

## 2021-01-27 RX ADMIN — ACETAMINOPHEN 650 MG: 325 TABLET, FILM COATED ORAL at 17:47

## 2021-01-27 RX ADMIN — IOPAMIDOL 85 ML: 612 INJECTION, SOLUTION INTRAVENOUS at 15:00

## 2021-01-27 RX ADMIN — SODIUM CHLORIDE, PRESERVATIVE FREE 10 ML: 5 INJECTION INTRAVENOUS at 21:35

## 2021-01-27 RX ADMIN — HYDROCODONE BITARTRATE AND ACETAMINOPHEN 1 TABLET: 5; 325 TABLET ORAL at 21:35

## 2021-01-28 PROBLEM — R10.84 GENERALIZED ABDOMINAL PAIN: Status: ACTIVE | Noted: 2021-01-26

## 2021-01-28 LAB
ANION GAP SERPL CALCULATED.3IONS-SCNC: 11.4 MMOL/L (ref 5–15)
BASOPHILS # BLD AUTO: 0.02 10*3/MM3 (ref 0–0.2)
BASOPHILS NFR BLD AUTO: 0.2 % (ref 0–1.5)
BUN SERPL-MCNC: 10 MG/DL (ref 6–20)
BUN/CREAT SERPL: 16.7 (ref 7–25)
CALCIUM SPEC-SCNC: 9.5 MG/DL (ref 8.6–10.5)
CHLORIDE SERPL-SCNC: 102 MMOL/L (ref 98–107)
CO2 SERPL-SCNC: 25.6 MMOL/L (ref 22–29)
CREAT SERPL-MCNC: 0.6 MG/DL (ref 0.57–1)
DEPRECATED RDW RBC AUTO: 42.9 FL (ref 37–54)
EOSINOPHIL # BLD AUTO: 0.03 10*3/MM3 (ref 0–0.4)
EOSINOPHIL NFR BLD AUTO: 0.3 % (ref 0.3–6.2)
ERYTHROCYTE [DISTWIDTH] IN BLOOD BY AUTOMATED COUNT: 12.5 % (ref 12.3–15.4)
ERYTHROCYTE [SEDIMENTATION RATE] IN BLOOD: 6 MM/HR (ref 0–20)
GFR SERPL CREATININE-BSD FRML MDRD: 122 ML/MIN/1.73
GLUCOSE SERPL-MCNC: 81 MG/DL (ref 65–99)
HCT VFR BLD AUTO: 40.3 % (ref 34–46.6)
HGB BLD-MCNC: 13.6 G/DL (ref 12–15.9)
IMM GRANULOCYTES # BLD AUTO: 0.05 10*3/MM3 (ref 0–0.05)
IMM GRANULOCYTES NFR BLD AUTO: 0.5 % (ref 0–0.5)
LYMPHOCYTES # BLD AUTO: 2.13 10*3/MM3 (ref 0.7–3.1)
LYMPHOCYTES NFR BLD AUTO: 20.1 % (ref 19.6–45.3)
MCH RBC QN AUTO: 31.8 PG (ref 26.6–33)
MCHC RBC AUTO-ENTMCNC: 33.7 G/DL (ref 31.5–35.7)
MCV RBC AUTO: 94.2 FL (ref 79–97)
MONOCYTES # BLD AUTO: 0.69 10*3/MM3 (ref 0.1–0.9)
MONOCYTES NFR BLD AUTO: 6.5 % (ref 5–12)
NEUTROPHILS NFR BLD AUTO: 7.66 10*3/MM3 (ref 1.7–7)
NEUTROPHILS NFR BLD AUTO: 72.4 % (ref 42.7–76)
NRBC BLD AUTO-RTO: 0 /100 WBC (ref 0–0.2)
PLATELET # BLD AUTO: 250 10*3/MM3 (ref 140–450)
PMV BLD AUTO: 10.8 FL (ref 6–12)
POTASSIUM SERPL-SCNC: 3.9 MMOL/L (ref 3.5–5.2)
RBC # BLD AUTO: 4.28 10*6/MM3 (ref 3.77–5.28)
SODIUM SERPL-SCNC: 139 MMOL/L (ref 136–145)
WBC # BLD AUTO: 10.58 10*3/MM3 (ref 3.4–10.8)

## 2021-01-28 PROCEDURE — 25010000002 ONDANSETRON PER 1 MG: Performed by: HOSPITALIST

## 2021-01-28 PROCEDURE — 25010000002 METHYLPREDNISOLONE PER 125 MG: Performed by: INTERNAL MEDICINE

## 2021-01-28 PROCEDURE — 80048 BASIC METABOLIC PNL TOTAL CA: CPT | Performed by: HOSPITALIST

## 2021-01-28 PROCEDURE — 99232 SBSQ HOSP IP/OBS MODERATE 35: CPT | Performed by: INTERNAL MEDICINE

## 2021-01-28 PROCEDURE — 85652 RBC SED RATE AUTOMATED: CPT | Performed by: INTERNAL MEDICINE

## 2021-01-28 PROCEDURE — 85025 COMPLETE CBC W/AUTO DIFF WBC: CPT | Performed by: HOSPITALIST

## 2021-01-28 RX ORDER — POLYETHYLENE GLYCOL 3350 17 G/17G
1 POWDER, FOR SOLUTION ORAL ONCE
Status: COMPLETED | OUTPATIENT
Start: 2021-01-28 | End: 2021-01-28

## 2021-01-28 RX ORDER — BISACODYL 5 MG/1
10 TABLET, DELAYED RELEASE ORAL ONCE
Status: COMPLETED | OUTPATIENT
Start: 2021-01-28 | End: 2021-01-28

## 2021-01-28 RX ADMIN — ONDANSETRON 4 MG: 2 INJECTION INTRAMUSCULAR; INTRAVENOUS at 21:50

## 2021-01-28 RX ADMIN — CITALOPRAM 40 MG: 40 TABLET, FILM COATED ORAL at 08:56

## 2021-01-28 RX ADMIN — SODIUM CHLORIDE, PRESERVATIVE FREE 10 ML: 5 INJECTION INTRAVENOUS at 21:50

## 2021-01-28 RX ADMIN — Medication 1000 MCG: at 08:56

## 2021-01-28 RX ADMIN — POLYETHYLENE GLYCOL 3350 1 BOTTLE: 17 POWDER, FOR SOLUTION ORAL at 17:21

## 2021-01-28 RX ADMIN — METHYLPREDNISOLONE SODIUM SUCCINATE 60 MG: 125 INJECTION, POWDER, FOR SOLUTION INTRAMUSCULAR; INTRAVENOUS at 08:56

## 2021-01-28 RX ADMIN — MULTIPLE VITAMINS W/ MINERALS TAB 1 TABLET: TAB at 08:56

## 2021-01-28 RX ADMIN — SODIUM CHLORIDE, PRESERVATIVE FREE 10 ML: 5 INJECTION INTRAVENOUS at 05:59

## 2021-01-28 RX ADMIN — BISACODYL 10 MG: 5 TABLET ORAL at 17:09

## 2021-01-28 RX ADMIN — SODIUM CHLORIDE, PRESERVATIVE FREE 10 ML: 5 INJECTION INTRAVENOUS at 08:56

## 2021-01-28 RX ADMIN — ONDANSETRON 4 MG: 2 INJECTION INTRAMUSCULAR; INTRAVENOUS at 05:59

## 2021-01-29 ENCOUNTER — ANESTHESIA EVENT (OUTPATIENT)
Dept: GASTROENTEROLOGY | Facility: HOSPITAL | Age: 26
End: 2021-01-29

## 2021-01-29 ENCOUNTER — ANESTHESIA (OUTPATIENT)
Dept: GASTROENTEROLOGY | Facility: HOSPITAL | Age: 26
End: 2021-01-29

## 2021-01-29 LAB
ADV 40+41 DNA STL QL NAA+NON-PROBE: NOT DETECTED
ASTRO TYP 1-8 RNA STL QL NAA+NON-PROBE: NOT DETECTED
C CAYETANENSIS DNA STL QL NAA+NON-PROBE: NOT DETECTED
C DIFF TOX GENS STL QL NAA+PROBE: NEGATIVE
CAMPY SP DNA.DIARRHEA STL QL NAA+PROBE: NOT DETECTED
CRYPTOSP STL CULT: NOT DETECTED
E HISTOLYT AG STL-ACNC: NOT DETECTED
EAEC PAA PLAS AGGR+AATA ST NAA+NON-PRB: NOT DETECTED
EC STX1 + STX2 GENES STL NAA+PROBE: NOT DETECTED
EPEC EAE GENE STL QL NAA+NON-PROBE: NOT DETECTED
ETEC LTA+ST1A+ST1B TOX ST NAA+NON-PROBE: NOT DETECTED
G LAMBLIA DNA SPEC QL NAA+PROBE: NOT DETECTED
NOROVIRUS GI+II RNA STL QL NAA+NON-PROBE: NOT DETECTED
P SHIGELLOIDES DNA STL QL NAA+PROBE: NOT DETECTED
RV RNA STL NAA+PROBE: NOT DETECTED
SALMONELLA DNA SPEC QL NAA+PROBE: NOT DETECTED
SAPO I+II+IV+V RNA STL QL NAA+NON-PROBE: NOT DETECTED
SHIGELLA SP+EIEC IPAH STL QL NAA+PROBE: NOT DETECTED
V CHOLERAE DNA SPEC QL NAA+PROBE: NOT DETECTED
VIBRIO DNA SPEC NAA+PROBE: NOT DETECTED
YERSINIA STL CULT: NOT DETECTED

## 2021-01-29 PROCEDURE — 0DBB8ZX EXCISION OF ILEUM, VIA NATURAL OR ARTIFICIAL OPENING ENDOSCOPIC, DIAGNOSTIC: ICD-10-PCS | Performed by: INTERNAL MEDICINE

## 2021-01-29 PROCEDURE — 0DBK8ZX EXCISION OF ASCENDING COLON, VIA NATURAL OR ARTIFICIAL OPENING ENDOSCOPIC, DIAGNOSTIC: ICD-10-PCS | Performed by: INTERNAL MEDICINE

## 2021-01-29 PROCEDURE — 25010000002 PROPOFOL 10 MG/ML EMULSION: Performed by: ANESTHESIOLOGY

## 2021-01-29 PROCEDURE — 88305 TISSUE EXAM BY PATHOLOGIST: CPT | Performed by: INTERNAL MEDICINE

## 2021-01-29 PROCEDURE — 0DBL8ZX EXCISION OF TRANSVERSE COLON, VIA NATURAL OR ARTIFICIAL OPENING ENDOSCOPIC, DIAGNOSTIC: ICD-10-PCS | Performed by: INTERNAL MEDICINE

## 2021-01-29 PROCEDURE — 87493 C DIFF AMPLIFIED PROBE: CPT | Performed by: INTERNAL MEDICINE

## 2021-01-29 PROCEDURE — 0097U HC BIOFIRE FILMARRAY GI PANEL: CPT | Performed by: INTERNAL MEDICINE

## 2021-01-29 PROCEDURE — 0DBM8ZX EXCISION OF DESCENDING COLON, VIA NATURAL OR ARTIFICIAL OPENING ENDOSCOPIC, DIAGNOSTIC: ICD-10-PCS | Performed by: INTERNAL MEDICINE

## 2021-01-29 PROCEDURE — 0DBH8ZX EXCISION OF CECUM, VIA NATURAL OR ARTIFICIAL OPENING ENDOSCOPIC, DIAGNOSTIC: ICD-10-PCS | Performed by: INTERNAL MEDICINE

## 2021-01-29 PROCEDURE — 0DBN8ZX EXCISION OF SIGMOID COLON, VIA NATURAL OR ARTIFICIAL OPENING ENDOSCOPIC, DIAGNOSTIC: ICD-10-PCS | Performed by: INTERNAL MEDICINE

## 2021-01-29 PROCEDURE — 45380 COLONOSCOPY AND BIOPSY: CPT | Performed by: INTERNAL MEDICINE

## 2021-01-29 PROCEDURE — 25010000002 METHYLPREDNISOLONE PER 125 MG: Performed by: INTERNAL MEDICINE

## 2021-01-29 PROCEDURE — 0DBP8ZX EXCISION OF RECTUM, VIA NATURAL OR ARTIFICIAL OPENING ENDOSCOPIC, DIAGNOSTIC: ICD-10-PCS | Performed by: INTERNAL MEDICINE

## 2021-01-29 RX ORDER — LIDOCAINE HYDROCHLORIDE 20 MG/ML
INJECTION, SOLUTION INFILTRATION; PERINEURAL AS NEEDED
Status: DISCONTINUED | OUTPATIENT
Start: 2021-01-29 | End: 2021-01-29 | Stop reason: SURG

## 2021-01-29 RX ORDER — PROPOFOL 10 MG/ML
VIAL (ML) INTRAVENOUS AS NEEDED
Status: DISCONTINUED | OUTPATIENT
Start: 2021-01-29 | End: 2021-01-29 | Stop reason: SURG

## 2021-01-29 RX ORDER — MESALAMINE 4 G/60ML
4 ENEMA RECTAL NIGHTLY
Status: DISCONTINUED | OUTPATIENT
Start: 2021-01-29 | End: 2021-01-30 | Stop reason: HOSPADM

## 2021-01-29 RX ORDER — EPHEDRINE SULFATE 50 MG/ML
INJECTION, SOLUTION INTRAVENOUS AS NEEDED
Status: DISCONTINUED | OUTPATIENT
Start: 2021-01-29 | End: 2021-01-29 | Stop reason: SURG

## 2021-01-29 RX ORDER — SODIUM CHLORIDE 9 MG/ML
30 INJECTION, SOLUTION INTRAVENOUS CONTINUOUS PRN
Status: DISCONTINUED | OUTPATIENT
Start: 2021-01-29 | End: 2021-01-30 | Stop reason: HOSPADM

## 2021-01-29 RX ORDER — PROPOFOL 10 MG/ML
VIAL (ML) INTRAVENOUS CONTINUOUS PRN
Status: DISCONTINUED | OUTPATIENT
Start: 2021-01-29 | End: 2021-01-29 | Stop reason: SURG

## 2021-01-29 RX ADMIN — SODIUM CHLORIDE, PRESERVATIVE FREE 10 ML: 5 INJECTION INTRAVENOUS at 21:58

## 2021-01-29 RX ADMIN — PROPOFOL 100 MG: 10 INJECTION, EMULSION INTRAVENOUS at 09:27

## 2021-01-29 RX ADMIN — PROPOFOL 30 MG: 10 INJECTION, EMULSION INTRAVENOUS at 09:29

## 2021-01-29 RX ADMIN — PROPOFOL 30 MG: 10 INJECTION, EMULSION INTRAVENOUS at 09:38

## 2021-01-29 RX ADMIN — METHYLPREDNISOLONE SODIUM SUCCINATE 60 MG: 125 INJECTION, POWDER, FOR SOLUTION INTRAMUSCULAR; INTRAVENOUS at 08:03

## 2021-01-29 RX ADMIN — LIDOCAINE HYDROCHLORIDE 60 MG: 20 INJECTION, SOLUTION INFILTRATION; PERINEURAL at 09:27

## 2021-01-29 RX ADMIN — SODIUM CHLORIDE 30 ML/HR: 9 INJECTION, SOLUTION INTRAVENOUS at 09:14

## 2021-01-29 RX ADMIN — CITALOPRAM 40 MG: 40 TABLET, FILM COATED ORAL at 11:36

## 2021-01-29 RX ADMIN — SODIUM CHLORIDE, PRESERVATIVE FREE 10 ML: 5 INJECTION INTRAVENOUS at 08:10

## 2021-01-29 RX ADMIN — PROPOFOL 160 MCG/KG/MIN: 10 INJECTION, EMULSION INTRAVENOUS at 09:27

## 2021-01-29 RX ADMIN — MESALAMINE 4 G: 4 ENEMA RECTAL at 21:52

## 2021-01-29 RX ADMIN — SODIUM CHLORIDE: 9 INJECTION, SOLUTION INTRAVENOUS at 09:22

## 2021-01-29 RX ADMIN — MULTIPLE VITAMINS W/ MINERALS TAB 1 TABLET: TAB at 11:36

## 2021-01-29 RX ADMIN — ACETAMINOPHEN 650 MG: 325 TABLET, FILM COATED ORAL at 00:09

## 2021-01-29 RX ADMIN — ACETAMINOPHEN 650 MG: 325 TABLET, FILM COATED ORAL at 16:45

## 2021-01-29 RX ADMIN — EPHEDRINE SULFATE 10 MG: 50 INJECTION INTRAVENOUS at 09:32

## 2021-01-29 RX ADMIN — Medication 1000 MCG: at 11:36

## 2021-01-29 NOTE — ANESTHESIA POSTPROCEDURE EVALUATION
"Patient: Rajeev Mendoza    Procedure Summary     Date: 01/29/21 Room / Location:  [facility]    Anesthesia Start: 0920 Anesthesia Stop: 0950    Procedure: COLONOSCOPY TO CECUM WITH COLD BIOPSIES, POLYPECTOMIES (N/A ) Diagnosis:       Generalized abdominal pain      Blood per rectum      Inflammatory bowel disease      (Generalized abdominal pain [R10.84])      (Blood per rectum [K62.5])      (Inflammatory bowel disease [K52.9])    Surgeon: Joaquin Ko MD Provider: Joaquin Scanlon MD    Anesthesia Type: MAC ASA Status: 2          Anesthesia Type: MAC    Vitals  Vitals Value Taken Time   /66 01/29/21 0951   Temp     Pulse 74 01/29/21 0951   Resp 16 01/29/21 0951   SpO2 99 % 01/29/21 0951           Post Anesthesia Care and Evaluation    Patient location during evaluation: bedside  Patient participation: complete - patient participated  Level of consciousness: awake and alert  Pain management: adequate  Airway patency: patent  Anesthetic complications: No anesthetic complications    Cardiovascular status: acceptable  Respiratory status: acceptable  Hydration status: acceptable    Comments: /66 (BP Location: Left arm, Patient Position: Lying)   Pulse 74   Temp 36.1 °C (96.9 °F) (Oral)   Resp 16   Ht 165.1 cm (65\")   Wt 64.9 kg (143 lb)   LMP 08/01/2020 Comment: IUD  SpO2 99%   Breastfeeding No   BMI 23.80 kg/m²       "

## 2021-01-29 NOTE — ANESTHESIA PREPROCEDURE EVALUATION
Anesthesia Evaluation     Patient summary reviewed   NPO Solid Status: > 8 hours  NPO Liquid Status: > 2 hours           Airway   Mallampati: I  TM distance: >3 FB  Neck ROM: full  Dental      Pulmonary     breath sounds clear to auscultation  (+) a smoker Former,   (-) shortness of breath  Cardiovascular   Exercise tolerance: good (4-7 METS)    Rhythm: regular  Rate: normal    (-) angina, GUILLERMO      Neuro/Psych  (+) psychiatric history Anxiety and Depression,     GI/Hepatic/Renal/Endo    (+)  GI bleeding ,     Musculoskeletal     Abdominal    Substance History      OB/GYN          Other                        Anesthesia Plan    ASA 2     MAC   (MAC anesthesia discussed with patient and/or patient representative. Risks (including but not limited to intra-op awareness), benefits, and alternatives were discussed. Understanding was voiced with an agreement to proceed with a MAC technique and General as a backup option.   )  intravenous induction     Anesthetic plan, all risks, benefits, and alternatives have been provided, discussed and informed consent has been obtained with: patient.

## 2021-01-30 VITALS
DIASTOLIC BLOOD PRESSURE: 69 MMHG | HEART RATE: 87 BPM | SYSTOLIC BLOOD PRESSURE: 115 MMHG | BODY MASS INDEX: 23.82 KG/M2 | WEIGHT: 143 LBS | TEMPERATURE: 97.4 F | OXYGEN SATURATION: 98 % | HEIGHT: 65 IN | RESPIRATION RATE: 16 BRPM

## 2021-01-30 LAB
ALBUMIN SERPL-MCNC: 3.9 G/DL (ref 3.5–5.2)
ALBUMIN/GLOB SERPL: 1.6 G/DL
ALP SERPL-CCNC: 33 U/L (ref 39–117)
ALT SERPL W P-5'-P-CCNC: 8 U/L (ref 1–33)
ANION GAP SERPL CALCULATED.3IONS-SCNC: 8.8 MMOL/L (ref 5–15)
AST SERPL-CCNC: 9 U/L (ref 1–32)
BILIRUB SERPL-MCNC: 0.6 MG/DL (ref 0–1.2)
BUN SERPL-MCNC: 12 MG/DL (ref 6–20)
BUN/CREAT SERPL: 18.5 (ref 7–25)
CALCIUM SPEC-SCNC: 8.8 MG/DL (ref 8.6–10.5)
CHLORIDE SERPL-SCNC: 104 MMOL/L (ref 98–107)
CO2 SERPL-SCNC: 24.2 MMOL/L (ref 22–29)
CREAT SERPL-MCNC: 0.65 MG/DL (ref 0.57–1)
DEPRECATED RDW RBC AUTO: 45.1 FL (ref 37–54)
ERYTHROCYTE [DISTWIDTH] IN BLOOD BY AUTOMATED COUNT: 12.8 % (ref 12.3–15.4)
GFR SERPL CREATININE-BSD FRML MDRD: 111 ML/MIN/1.73
GLOBULIN UR ELPH-MCNC: 2.5 GM/DL
GLUCOSE SERPL-MCNC: 87 MG/DL (ref 65–99)
HCT VFR BLD AUTO: 37.3 % (ref 34–46.6)
HGB BLD-MCNC: 12.5 G/DL (ref 12–15.9)
MCH RBC QN AUTO: 32 PG (ref 26.6–33)
MCHC RBC AUTO-ENTMCNC: 33.5 G/DL (ref 31.5–35.7)
MCV RBC AUTO: 95.4 FL (ref 79–97)
PLATELET # BLD AUTO: 200 10*3/MM3 (ref 140–450)
PMV BLD AUTO: 10.4 FL (ref 6–12)
POTASSIUM SERPL-SCNC: 3.7 MMOL/L (ref 3.5–5.2)
PROT SERPL-MCNC: 6.4 G/DL (ref 6–8.5)
RBC # BLD AUTO: 3.91 10*6/MM3 (ref 3.77–5.28)
SODIUM SERPL-SCNC: 137 MMOL/L (ref 136–145)
WBC # BLD AUTO: 8.31 10*3/MM3 (ref 3.4–10.8)

## 2021-01-30 PROCEDURE — 85027 COMPLETE CBC AUTOMATED: CPT | Performed by: NURSE PRACTITIONER

## 2021-01-30 PROCEDURE — 80053 COMPREHEN METABOLIC PANEL: CPT | Performed by: NURSE PRACTITIONER

## 2021-01-30 PROCEDURE — 25010000002 METHYLPREDNISOLONE PER 125 MG: Performed by: INTERNAL MEDICINE

## 2021-01-30 PROCEDURE — 99232 SBSQ HOSP IP/OBS MODERATE 35: CPT | Performed by: INTERNAL MEDICINE

## 2021-01-30 RX ORDER — MESALAMINE 4 G/60ML
4 ENEMA RECTAL NIGHTLY
Qty: 30 ENEMA | Refills: 0 | Status: SHIPPED | OUTPATIENT
Start: 2021-01-30 | End: 2021-02-25

## 2021-01-30 RX ORDER — PREDNISONE 10 MG/1
10 TABLET ORAL DAILY
Qty: 126 TABLET | Refills: 0 | Status: SHIPPED | OUTPATIENT
Start: 2021-01-30 | End: 2021-02-25 | Stop reason: ALTCHOICE

## 2021-01-30 RX ADMIN — CITALOPRAM 40 MG: 40 TABLET, FILM COATED ORAL at 08:36

## 2021-01-30 RX ADMIN — METHYLPREDNISOLONE SODIUM SUCCINATE 60 MG: 125 INJECTION, POWDER, FOR SOLUTION INTRAMUSCULAR; INTRAVENOUS at 08:37

## 2021-01-30 RX ADMIN — Medication 1000 MCG: at 08:37

## 2021-01-30 RX ADMIN — MULTIPLE VITAMINS W/ MINERALS TAB 1 TABLET: TAB at 08:36

## 2021-02-01 LAB
CYTO UR: NORMAL
LAB AP CASE REPORT: NORMAL
LAB AP DIAGNOSIS COMMENT: NORMAL
PATH REPORT.FINAL DX SPEC: NORMAL
PATH REPORT.GROSS SPEC: NORMAL

## 2021-02-01 NOTE — PROGRESS NOTES
Discharge Planning Assessment  [facility]     Patient Name: Rajeev Mendoza  MRN: [id number]  Today's Date: 2/1/2021    Admit Date: 1/26/2021    Discharge Needs Assessment    No documentation.       Discharge Plan     Row Name 02/01/21 1639       Plan    Plan  Home    Final Discharge Disposition Code  01 - home or self-care    Final Note  Home        Continued Care and Services - Discharged on 1/30/2021 Admission date: 1/26/2021 - Discharge disposition: Home or Self Care   Coordination has not been started for this encounter.       Expected Discharge Date and Time     Expected Discharge Date Expected Discharge Time    Jan 30, 2021         Demographic Summary    No documentation.       Functional Status    No documentation.       Psychosocial    No documentation.       Abuse/Neglect    No documentation.       Legal    No documentation.       Substance Abuse    No documentation.       Patient Forms    No documentation.           Nathalie Velez RN

## 2021-02-19 ENCOUNTER — HOSPITAL ENCOUNTER (OUTPATIENT)
Dept: LAB | Facility: HOSPITAL | Age: 26
Discharge: HOME | End: 2021-02-19
Attending: INTERNAL MEDICINE

## 2021-02-19 LAB
BASOPHILS # BLD AUTO: 0.03 10*3/UL (ref 0–0.2)
BASOPHILS # BLD: 1 % (ref 0–3)
BASOPHILS NFR BLD AUTO: 0.5 % (ref 0–3)
CONV ABS BANDS: 0 % (ref 1–5)
CONV ABS IMM GRAN: 0.04 10*3/UL (ref 0–0.2)
CONV IMMATURE GRAN: 0.6 % (ref 0–1.8)
CONV SEGMENTED NEUTROPHILS: 55 % (ref 45–70)
DEPRECATED RDW RBC AUTO: 43.3 FL (ref 36.4–46.3)
EOSINOPHIL # BLD AUTO: 0.04 10*3/UL (ref 0–0.7)
EOSINOPHIL # BLD AUTO: 0.6 % (ref 0–7)
EOSINOPHIL NFR BLD AUTO: 0 % (ref 0–7)
ERYTHROCYTE [DISTWIDTH] IN BLOOD BY AUTOMATED COUNT: 12.4 % (ref 11.7–14.4)
HCT VFR BLD AUTO: 40.5 % (ref 37–47)
HGB BLD-MCNC: 13.9 G/DL (ref 12–16)
LYMPHOCYTES # BLD AUTO: 2.66 10*3/UL (ref 1–5)
LYMPHOCYTES NFR BLD AUTO: 40.5 % (ref 20–45)
MCH RBC QN AUTO: 32.1 PG (ref 27–31)
MCHC RBC AUTO-ENTMCNC: 34.3 G/DL (ref 33–37)
MCV RBC AUTO: 93.5 FL (ref 81–99)
MONOCYTES # BLD AUTO: 0.41 10*3/UL (ref 0.2–1.2)
MONOCYTES NFR BLD AUTO: 6.2 % (ref 3–10)
MONOCYTES NFR BLD MANUAL: 7 % (ref 3–10)
NEUTROPHILS # BLD AUTO: 3.39 10*3/UL (ref 2–8)
NEUTROPHILS NFR BLD AUTO: 51.6 % (ref 30–85)
NRBC CBCN: 0 % (ref 0–0.7)
NUC CELL # PRT MANUAL: 0 /100{WBCS}
PLAT MORPH BLD: NORMAL
PLATELET # BLD AUTO: 235 10*3/UL (ref 130–400)
PMV BLD AUTO: 11.1 FL (ref 9.4–12.3)
RBC # BLD AUTO: 4.33 10*6/UL (ref 4.2–5.4)
SMALL PLATELETS BLD QL SMEAR: ADEQUATE
VARIANT LYMPHS NFR BLD MANUAL: 37 % (ref 20–45)
WBC # BLD AUTO: 6.57 10*3/UL (ref 4.8–10.8)

## 2021-02-25 ENCOUNTER — OFFICE VISIT (OUTPATIENT)
Dept: GASTROENTEROLOGY | Facility: CLINIC | Age: 26
End: 2021-02-25

## 2021-02-25 VITALS
TEMPERATURE: 97.8 F | WEIGHT: 146.6 LBS | HEIGHT: 65 IN | BODY MASS INDEX: 24.43 KG/M2 | DIASTOLIC BLOOD PRESSURE: 60 MMHG | OXYGEN SATURATION: 99 % | SYSTOLIC BLOOD PRESSURE: 102 MMHG | RESPIRATION RATE: 16 BRPM | HEART RATE: 85 BPM

## 2021-02-25 DIAGNOSIS — K51.00 ULCERATIVE PANCOLITIS WITHOUT COMPLICATION (HCC): Primary | ICD-10-CM

## 2021-02-25 DIAGNOSIS — Z79.899 HIGH RISK MEDICATION USE: ICD-10-CM

## 2021-02-25 PROCEDURE — 99213 OFFICE O/P EST LOW 20 MIN: CPT | Performed by: NURSE PRACTITIONER

## 2021-02-25 RX ORDER — PREDNISONE 1 MG/1
TABLET ORAL
Qty: 210 TABLET | Refills: 0 | Status: SHIPPED | OUTPATIENT
Start: 2021-02-25 | End: 2021-06-09

## 2021-02-25 NOTE — PROGRESS NOTES
"Chief Complaint   Patient presents with   • Follow-up     hospitalization           History of Present Illness  25-year-old female presents today for follow-up after hospitalization.  Last office visit January 26, 2021 with Dr. Chowdhury.  She has a history of ulcerative colitis and has been following with Dr. Catalina Akins.  He was hospitalized January 26, 2021 and discharged January 30, 2021.  She presents today for follow-up.    During hospitalization she had colonoscopy and CT enterography, summarized below.    She is currently on 30 mg prednisone and tapering by 10 every 7 days. She is due to start 20 mg tomorrow.  Since starting prednisone she is no longer having diarrhea or rectal bleeding.  She was unable to afford mesalamine enemas after she was discharged from the hospital.  She is having typically 2-3 bowel movements per day.  When she was on higher dose of steroid she was having more constipation.    She denies abdominal pain, nausea, vomiting and is overall very pleased with the improvement in symptoms.    She denies history of nonhealing skin lesions or extraintestinal manifestations related to inflammatory bowel disease.    She does not smoke.    Previous treatments include 6-MP, azathioprine, Humira, Remicade and sulfasalazine.  She was first diagnosed January 2012.  January 29, 2021..  Diffuse moderate inflammation with erythema and aphthous ulcerations found in the rectum, rectosigmoid colon and sigmoid colon extending to about 30 cm.  Pseudopolyps in the transverse colon at the hepatic flexure.    Left sided colitis with right-sided pseudopolyps.  Normal terminal ileum.       Result Review :       Tissue Pathology Exam (01/29/2021 09:39)   COLONOSCOPY (01/29/2021 09:20)   US Gallbladder (01/27/2021 15:13)   CT Enterography Abdomen Pelvis w Contrast (01/27/2021 14:04)       Vital Signs:   /60   Pulse 85   Temp 97.8 °F (36.6 °C)   Resp 16   Ht 165.1 cm (65\")   Wt 66.5 kg (146 lb 9.6 oz)   " SpO2 99%   BMI 24.40 kg/m²     Body mass index is 24.4 kg/m².     Physical Exam  Vitals signs reviewed.   Constitutional:       General: She is not in acute distress.     Appearance: Normal appearance. She is normal weight. She is not ill-appearing.   Abdominal:      General: Abdomen is flat. Bowel sounds are normal. There is no distension.      Palpations: Abdomen is soft. Abdomen is not rigid. There is no mass or pulsatile mass.      Tenderness: There is no abdominal tenderness. There is no guarding or rebound.   Neurological:      Mental Status: She is alert.           Assessment and Plan    Diagnoses and all orders for this visit:    1. Ulcerative pancolitis without complication (CMS/HCC) (Primary)  -     TSPOT  -     Hepatitis B Surface Antigen  -     Hepatitis B Surface Antibody  -     Hepatitis B Core Ab W/Reflex    2. High risk medication use  -     TSPOT  -     Hepatitis B Surface Antigen  -     Hepatitis B Surface Antibody  -     Hepatitis B Core Ab W/Reflex    Reviewed hospital records, left side colitis with pseudopolyps.  Discussed diagnosis of ulcerative colitis versus Crohn's disease.  At this time findings favor ulcerative colitis however Crohn's colitis is not to be excluded and treatment pathways for ulcerative colitis or Crohn's colitis at this time are exactly the same.  Risks and benefits of treatment options including Stelara discussed in detail and patient wishes to proceed.   Will obtain blood work to begin approval for Stelara.  Will slowly taper prednisone, new prescription with slower taper sent to pharmacy.    After she has completed induction dose of Stelara recommend blood work with CBC and hepatic function panel.        Follow Up   No follow-ups on file.    Patient Instructions   Slow prednisone taper decreasing by 5 mg tomorrow every 14 days, refill sent to pharmacy. Take as directed.     Obtain blood work testing for hepatitis B and tuberculosis.  This is necessary prior to  beginning approval process for Stelara.    After you have had the induction dose of Stelara, recommend blood work with CBC and hepatic function panel, please contact the office after you have had your induction infusion and we will send orders for blood work to your home address.    While on prednisone, please follow strict CDC guidelines including hand hygiene, masking and other CDC COVID-19 recommendations.    Please contact the office with any questions or concerns otherwise follow-up after you have had your first infusion, sooner if symptoms change or condition warrants.

## 2021-02-25 NOTE — PATIENT INSTRUCTIONS
Slow prednisone taper decreasing by 5 mg tomorrow every 14 days, refill sent to pharmacy. Take as directed.     Obtain blood work testing for hepatitis B and tuberculosis.  This is necessary prior to beginning approval process for Stelara.    After you have had the induction dose of Stelara, recommend blood work with CBC and hepatic function panel, please contact the office after you have had your induction infusion and we will send orders for blood work to your home address.    While on prednisone, please follow strict CDC guidelines including hand hygiene, masking and other CDC COVID-19 recommendations.    Please contact the office with any questions or concerns otherwise follow-up after you have had your first infusion, sooner if symptoms change or condition warrants.

## 2021-02-26 LAB
HBV CORE AB SERPL QL IA: NEGATIVE
HBV SURFACE AB SER QL: NON REACTIVE
HBV SURFACE AG SERPL QL IA: NEGATIVE

## 2021-03-08 ENCOUNTER — TELEPHONE (OUTPATIENT)
Dept: GASTROENTEROLOGY | Facility: CLINIC | Age: 26
End: 2021-03-08

## 2021-03-08 NOTE — TELEPHONE ENCOUNTER
Pt. States she sent an email for the [employer] application. Would like to make sure Nathalie received email, and what steps are next for receiving injections? Please advise.

## 2021-03-09 NOTE — TELEPHONE ENCOUNTER
Patient reports her insurance will pay for the Stelara infusion.  She has insurance until 03/31.  Will work on approval and send to [facility]. cece

## 2021-03-12 DIAGNOSIS — K51.00 ULCERATIVE PANCOLITIS WITHOUT COMPLICATION (HCC): Primary | ICD-10-CM

## 2021-03-12 PROBLEM — K51.90 ULCERATIVE COLITIS: Status: ACTIVE | Noted: 2021-03-12

## 2021-03-19 ENCOUNTER — HOSPITAL ENCOUNTER (OUTPATIENT)
Dept: INFUSION THERAPY | Facility: HOSPITAL | Age: 26
Discharge: HOME | End: 2021-03-19
Admitting: NURSE PRACTITIONER

## 2021-03-19 ENCOUNTER — TELEPHONE (OUTPATIENT)
Dept: GASTROENTEROLOGY | Facility: CLINIC | Age: 26
End: 2021-03-19

## 2021-03-19 VITALS
SYSTOLIC BLOOD PRESSURE: 125 MMHG | BODY MASS INDEX: 24.63 KG/M2 | DIASTOLIC BLOOD PRESSURE: 76 MMHG | TEMPERATURE: 97.5 F | WEIGHT: 148 LBS | OXYGEN SATURATION: 98 % | HEART RATE: 83 BPM | RESPIRATION RATE: 20 BRPM

## 2021-03-19 DIAGNOSIS — K51.819 OTHER ULCERATIVE COLITIS WITH COMPLICATION (HCC): Primary | ICD-10-CM

## 2021-03-19 PROCEDURE — 96365 THER/PROPH/DIAG IV INF INIT: CPT

## 2021-03-19 PROCEDURE — 25010000002 USTEKINUMAB 130 MG/26ML SOLUTION 26 ML VIAL: Performed by: NURSE PRACTITIONER

## 2021-03-19 PROCEDURE — 96366 THER/PROPH/DIAG IV INF ADDON: CPT

## 2021-03-19 RX ADMIN — USTEKINUMAB 390 MG: 130 SOLUTION INTRAVENOUS at 11:19

## 2021-03-19 NOTE — PATIENT INSTRUCTIONS
Ustekinumab injection  What is this medicine?  USTEKINUMAB (US te KIN ue mab) is used to treat plaque psoriasis and psoriatic arthritis. It is also used to treat Crohn's disease and ulcerative colitis. It is not a cure.  This medicine may be used for other purposes; ask your health care provider or pharmacist if you have questions.  COMMON BRAND NAME(S): Josy  What should I tell my health care provider before I take this medicine?  They need to know if you have any of these conditions:  · cancer  · diabetes  · history of skin cancer  · immune system problems  · infection (especially a virus infection such as chickenpox, cold sores, or herpes) or history of infections  · new or changing lesions on your skin  · receiving or have received allergy shots  · receive or have received phototherapy for the skin  · recently received or scheduled to receive a vaccine  · tuberculosis, a positive skin test for tuberculosis, or have recently been in close contact with someone who has tuberculosis  · an unusual reaction to ustekinumab, latex, other medicines, foods, dyes, or preservatives  · pregnant or trying to get pregnant  · breast-feeding  How should I use this medicine?  This medicine is for injection under the skin or infusion into a vein. It is usually given by a health care professional in a hospital or clinic setting. If you get this medicine at home, you will be taught how to prepare and give this medicine. Use exactly as directed. Take your medicine at regular intervals. Do not take your medicine more often than directed.  It is important that you put your used needles and syringes in a special sharps container. Do not put them in a trash can. If you do not have a sharps container, call your pharmacist or healthcare provider to get one.  A special MedGuide will be given to you by the pharmacist with each prescription and refill. Be sure to read this information carefully each time.  Talk to your pediatrician  regarding the use of this medicine in children. While this drug may be prescribed for children as young as 6 years for selected conditions, precautions do apply.  Overdosage: If you think you have taken too much of this medicine contact a poison control center or emergency room at once.  NOTE: This medicine is only for you. Do not share this medicine with others.  What if I miss a dose?  If you miss a dose, take it as soon as you can. If it is almost time for your next dose, take only that dose. Do not take double or extra doses.  What may interact with this medicine?  Do not take this medicine with any of the following medications:  · live virus vaccines  This medicine may also interact with the following medications:  · cyclosporine  · biologic medicines such as abatacept, adalimumab, anakinra, certolizumab, etanercept, golimumab, infliximab, rituximab, secukinumab, tocilizumab  · warfarin  This list may not describe all possible interactions. Give your health care provider a list of all the medicines, herbs, non-prescription drugs, or dietary supplements you use. Also tell them if you smoke, drink alcohol, or use illegal drugs. Some items may interact with your medicine.  What should I watch for while using this medicine?  Your condition will be monitored carefully while you are receiving this medicine. Tell your doctor or healthcare professional if your symptoms do not start to get better or if they get worse.  You will be tested for tuberculosis (TB) before you start this medicine. If your doctor prescribes any medicine for TB, you should start taking the TB medicine before starting this medicine. Make sure to finish the full course of TB medicine.  Call your doctor or health care professional if you get a cold or other infection while receiving this medicine. Do not treat yourself. This medicine may decrease your body's ability to fight infection.  Talk to your doctor about your risk of cancer. You may be more  at risk for certain types of cancers if you take this medicine.  What side effects may I notice from receiving this medicine?  Side effects that you should report to your doctor or health care professional as soon as possible:  · allergic reactions like skin rash, itching or hives, swelling of the face, lips, or tongue  · breathing problems  · changes in vision  · confusion  · headache  · persistent headache  · seizures  · signs and symptoms of infection like fever or chills; cough; sore throat; pain or trouble passing urine  · swollen lymph nodes in the neck, underarm, or groin areas  · unexplained weight loss  · unusually weak or tired  Side effects that usually do not require medical attention (report to your doctor or health care professional if they continue or are bothersome):  · diarrhea  · nausea, vomiting  · redness, itching, swelling, or bruising at site where injected  · stomach pain  · tiredness  This list may not describe all possible side effects. Call your doctor for medical advice about side effects. You may report side effects to FDA at 7-216-QXA-6221.  Where should I keep my medicine?  Keep out of the reach of children.  Store the prefilled syringes or vials in a refrigerator between 2 to 8 degrees C (36 to 46 degrees F). Keep in the original carton. Protect from light. Do not freeze. Do not shake. The vials should be stored upright. Throw away any unused medicine that has been stored in the refrigerator after the expiration date.  If needed, a prefilled syringe may be stored at room temperature up to 30 degrees C (86 degrees F) for a maximum of 30 days. Keep it in the original carton to protect from light. Record the date when it is removed from the refrigerator on the carton. Once a syringe has been stored at room temperature, do not put it back in the refrigerator. Throw the syringe away after 30 days at room temperature, even if it still has medicine in it.  NOTE: This sheet is a summary. It  may not cover all possible information. If you have questions about this medicine, talk to your doctor, pharmacist, or health care provider.  © 2021 [employer] Standard (2020-12-15 09:14:15)

## 2021-03-19 NOTE — TELEPHONE ENCOUNTER
Informed patient to continue the Prednisone taper.  She is currently at 20mg daily.  She will get her first infusion today. pk

## 2021-03-19 NOTE — PROGRESS NOTES
Tolerated infusion well.  VSS during and after infusion for post observation time.   PO soda ad lina.  Warm blanket and heated recliner for comfort.  BRP x 2, voided well.  AVS given and discharged home ambulatory after appointment completed.

## 2021-03-25 ENCOUNTER — TELEPHONE (OUTPATIENT)
Dept: GASTROENTEROLOGY | Facility: CLINIC | Age: 26
End: 2021-03-25

## 2021-03-25 NOTE — TELEPHONE ENCOUNTER
Okay to cancel.  She had a work-up while she was in the hospital after this date.  Please cancel appointment.  Thank you.

## 2021-03-25 NOTE — TELEPHONE ENCOUNTER
Patient has a CTE that is overdue from 10/2020. At the time, patient declined due to not having insurance. Does she still need this done or ok to cancel?    TS

## 2021-04-06 ENCOUNTER — HOSPITAL ENCOUNTER (OUTPATIENT)
Dept: LAB | Facility: HOSPITAL | Age: 26
Discharge: HOME | End: 2021-04-06
Attending: NURSE PRACTITIONER

## 2021-04-06 ENCOUNTER — TELEPHONE (OUTPATIENT)
Dept: GASTROENTEROLOGY | Facility: CLINIC | Age: 26
End: 2021-04-06

## 2021-04-06 DIAGNOSIS — K51.00 ULCERATIVE PANCOLITIS WITHOUT COMPLICATION (HCC): Primary | ICD-10-CM

## 2021-04-06 DIAGNOSIS — Z79.899 HIGH RISK MEDICATION USE: ICD-10-CM

## 2021-04-06 LAB
ALBUMIN SERPL-MCNC: 4.6 G/DL (ref 3.5–5)
ALP SERPL-CCNC: 38 U/L (ref 42–98)
ALT SERPL-CCNC: 8 U/L (ref 10–40)
AST SERPL-CCNC: 16 U/L (ref 15–50)
BASOPHILS # BLD AUTO: 0.02 10*3/UL (ref 0–0.2)
BASOPHILS NFR BLD AUTO: 0.3 % (ref 0–3)
BILIRUB SERPL-MCNC: 0.49 MG/DL (ref 0.2–1.3)
CONV ABS IMM GRAN: 0.02 10*3/UL (ref 0–0.2)
CONV BILI, CONJUGATED: <0.2 MG/DL (ref 0–0.6)
CONV IMMATURE GRAN: 0.3 % (ref 0–1.8)
CONV TOTAL PROTEIN: 7.4 G/DL (ref 6.3–8.2)
CONV UNCONJUGATED BILIRUBIN: 0.3 MG/DL (ref 0–1.1)
DEPRECATED RDW RBC AUTO: 40.7 FL (ref 36.4–46.3)
EOSINOPHIL # BLD AUTO: 0.01 10*3/UL (ref 0–0.7)
EOSINOPHIL # BLD AUTO: 0.1 % (ref 0–7)
ERYTHROCYTE [DISTWIDTH] IN BLOOD BY AUTOMATED COUNT: 11.8 % (ref 11.7–14.4)
HCT VFR BLD AUTO: 40.1 % (ref 37–47)
HGB BLD-MCNC: 14.3 G/DL (ref 12–16)
LYMPHOCYTES # BLD AUTO: 1.62 10*3/UL (ref 1–5)
LYMPHOCYTES NFR BLD AUTO: 22 % (ref 20–45)
MCH RBC QN AUTO: 33.4 PG (ref 27–31)
MCHC RBC AUTO-ENTMCNC: 35.7 G/DL (ref 33–37)
MCV RBC AUTO: 93.7 FL (ref 81–99)
MONOCYTES # BLD AUTO: 0.42 10*3/UL (ref 0.2–1.2)
MONOCYTES NFR BLD AUTO: 5.7 % (ref 3–10)
NEUTROPHILS # BLD AUTO: 5.27 10*3/UL (ref 2–8)
NEUTROPHILS NFR BLD AUTO: 71.6 % (ref 30–85)
NRBC CBCN: 0 % (ref 0–0.7)
PLATELET # BLD AUTO: 249 10*3/UL (ref 130–400)
PMV BLD AUTO: 11.4 FL (ref 9.4–12.3)
RBC # BLD AUTO: 4.28 10*6/UL (ref 4.2–5.4)
WBC # BLD AUTO: 7.36 10*3/UL (ref 4.8–10.8)

## 2021-04-06 NOTE — TELEPHONE ENCOUNTER
Rosi with [facility] is calling regarding pts T spot order , they are unable to do the lab but can do a Quantiferon gold test, please approve order and I will fax over to lab . Thank dee    [phone]

## 2021-04-09 ENCOUNTER — HOSPITAL ENCOUNTER (OUTPATIENT)
Dept: URGENT CARE | Facility: CLINIC | Age: 26
Discharge: HOME | End: 2021-04-09
Attending: FAMILY MEDICINE

## 2021-04-13 LAB
CONV QUANTIFERON TB GOLD: NEGATIVE
QUANTIFERON CRITERIA: NORMAL
QUANTIFERON MITOGEN VALUE: >10 IU/ML
QUANTIFERON NIL VALUE: 0.05 IU/ML
QUANTIFERON TB1 AG VALUE: 0.06 IU/ML
QUANTIFERON TB2 AG VALUE: 0.06 IU/ML

## 2021-05-16 VITALS — BODY MASS INDEX: 26.33 KG/M2 | WEIGHT: 158 LBS | HEIGHT: 65 IN

## 2021-06-09 ENCOUNTER — OFFICE VISIT (OUTPATIENT)
Dept: GASTROENTEROLOGY | Facility: CLINIC | Age: 26
End: 2021-06-09

## 2021-06-09 DIAGNOSIS — Z79.899 HIGH RISK MEDICATION USE: ICD-10-CM

## 2021-06-09 DIAGNOSIS — K51.011 ULCERATIVE PANCOLITIS WITH RECTAL BLEEDING (HCC): Primary | ICD-10-CM

## 2021-06-09 PROCEDURE — 99443 PR PHYS/QHP TELEPHONE EVALUATION 21-30 MIN: CPT | Performed by: NURSE PRACTITIONER

## 2021-06-09 NOTE — PATIENT INSTRUCTIONS
Obtain blood work for medication monitoring while on Stelara, orders mailed to home address.  Please take to lab and if you have not heard from our office 7 days after you have had your blood drawn, please contact our office and let us know which lab you had blood work drawn at.    For ulcerative colitis, continue Stelara injections every 8 weeks.    Would expect for rectal bleeding to improve as discussed the longer you are on Stelara.  If you have a change in symptoms or we may be of any further assistance prior to follow-up October 11, 2021, please contact the office at any time.    Plans for endoscopic evaluation approximately 9 months after starting Ustekinumab March 2021.  This will be discussed at follow-up visit as well.    Recommend continued masking and strict hand hygiene during pandemic for patients on Stelara.  Please contact the office with any questions or concerns regarding COVID-19 pandemic and inflammatory bowel disease.

## 2021-06-09 NOTE — PROGRESS NOTES
Chief Complaint   Patient presents with   • Follow-up   • Ulcerative Colitis         History of Present Illness  26-year-old female presents today for follow-up.  Last visit February 25, 2021. She has a history of ulcerative colitis (first diagnosed January 2012) and has been following with Dr. Catalina Akins.  She was hospitalized January 26 through January 30, 2021.  During hospitalization colonoscopy and CT enterography were performed.  She was discharged on prednisone taper and started on Ustekinumab.  Her first infusion was March 24, 2021.  Her first subcu maintenance injection was May 19, 2021.  This was given by her  without difficulty.    Patient reports significant improvement in overall symptoms.  She has had decreased fatigue and increased energy level.  She typically has 2-3 bowel movements per day.  She can experience occasional constipation at times with small fragmented stools and mucus.  She has had a return of bright red blood since completing prednisone taper.  This is present when she wipes as well in the stool with every bowel movement.    She denies tenesmus, urgency, nocturnal bowel movements or incontinence.    Previous treatments include 6-MP, azathioprine, adalimumab, infliximab and sulfasalazine.  This Halloween enemas were recommended after recent hospitalization however they were cost prohibitive.    You have chosen to receive care through a telephone visit.   Do you consent to use a telephone visit for your medical care today? Yes    Review of Systems   Constitutional: Negative for fatigue, fever and unexpected weight change.   Gastrointestinal: Positive for blood in stool.      Result Review :   Data reviewed: GI studies Colonoscopy January 29, 2021 and pathology.   Colonoscopy January 29, 2021.  Diffuse moderate inflammation with erythema and aphthous ulcerations found in the rectum, rectosigmoid and sigmoid colon extending to about 30 cm.  Pseudopolyps in the transverse colon  at the hepatic flexure.  Left-sided colitis with right-sided pseudopolyps, normal terminal ileum.    Physical Exam - TELEPHONE VISIT    Assessment and Plan    Diagnoses and all orders for this visit:    1. Ulcerative pancolitis with rectal bleeding (CMS/HCC) (Primary)    2. High risk medication use    She has had noticeable improvement in fatigue and GI symptoms except for rectal bleeding with prednisone taper and the initiation of Ustekinumab.  Discussed mechanism of action and expectations and would expect for rectal bleeding to lessen and improve the longer she is on Stelara.  If rectal bleeding persists, to consider hydrocortisone suppositories or enemas as mesalamine enemas were cost prohibitive.      High risk medication monitoring reviewed.  Will mail order for monitoring blood work to home address to be performed at any [facility] or nearby facility.    Recommend follow-up visit October 2021.  At that time, will make plans for repeat endoscopic evaluation after she has been on biologic therapy for 9 months to assess endoscopically and histologically for improvement.  This will be compared to January 2021 results during hospitalization.    Patient verbalized agreement and understanding.  Support and reassurance provided.      This visit has been rescheduled as a phone visit to comply with patient safety concerns in accordance with CDC recommendations. Total time of discussion was 22 minutes.    Patient Instructions   Obtain blood work for medication monitoring while on Stelara, orders mailed to home address.  Please take to lab and if you have not heard from our office 7 days after you have had your blood drawn, please contact our office and let us know which lab you had blood work drawn at.    For ulcerative colitis, continue Stelara injections every 8 weeks.    Would expect for rectal bleeding to improve as discussed the longer you are on Stelara.  If you have a change in symptoms or we may be of any further  assistance prior to follow-up October 11, 2021, please contact the office at any time.    Plans for endoscopic evaluation approximately 9 months after starting Ustekinumab March 2021.  This will be discussed at follow-up visit as well.    Recommend continued masking and strict hand hygiene during pandemic for patients on Stelara.  Please contact the office with any questions or concerns regarding COVID-19 pandemic and inflammatory bowel disease.     EMR Dragon/Transcription Disclaimer:  This document has been Dictated utilizing Dragon dictation.

## 2021-06-22 ENCOUNTER — LAB (OUTPATIENT)
Dept: LAB | Facility: HOSPITAL | Age: 26
End: 2021-06-22

## 2021-06-22 LAB
BASOPHILS # BLD AUTO: 0.04 10*3/MM3 (ref 0–0.2)
BASOPHILS NFR BLD AUTO: 0.8 % (ref 0–1.5)
DEPRECATED RDW RBC AUTO: 41.9 FL (ref 37–54)
EOSINOPHIL # BLD AUTO: 0.1 10*3/MM3 (ref 0–0.4)
EOSINOPHIL NFR BLD AUTO: 2.1 % (ref 0.3–6.2)
ERYTHROCYTE [DISTWIDTH] IN BLOOD BY AUTOMATED COUNT: 11.9 % (ref 12.3–15.4)
HCT VFR BLD AUTO: 39 % (ref 34–46.6)
HGB BLD-MCNC: 13.5 G/DL (ref 12–15.9)
IMM GRANULOCYTES # BLD AUTO: 0.01 10*3/MM3 (ref 0–0.05)
IMM GRANULOCYTES NFR BLD AUTO: 0.2 % (ref 0–0.5)
LYMPHOCYTES # BLD AUTO: 1.38 10*3/MM3 (ref 0.7–3.1)
LYMPHOCYTES NFR BLD AUTO: 29.1 % (ref 19.6–45.3)
MCH RBC QN AUTO: 32.9 PG (ref 26.6–33)
MCHC RBC AUTO-ENTMCNC: 34.6 G/DL (ref 31.5–35.7)
MCV RBC AUTO: 95.1 FL (ref 79–97)
MONOCYTES # BLD AUTO: 0.23 10*3/MM3 (ref 0.1–0.9)
MONOCYTES NFR BLD AUTO: 4.8 % (ref 5–12)
NEUTROPHILS NFR BLD AUTO: 2.99 10*3/MM3 (ref 1.7–7)
NEUTROPHILS NFR BLD AUTO: 63 % (ref 42.7–76)
NRBC BLD AUTO-RTO: 0 /100 WBC (ref 0–0.2)
PLATELET # BLD AUTO: 234 10*3/MM3 (ref 140–450)
PMV BLD AUTO: 11.4 FL (ref 6–12)
RBC # BLD AUTO: 4.1 10*6/MM3 (ref 3.77–5.28)
WBC # BLD AUTO: 4.75 10*3/MM3 (ref 3.4–10.8)

## 2021-06-22 PROCEDURE — 80076 HEPATIC FUNCTION PANEL: CPT | Performed by: NURSE PRACTITIONER

## 2021-06-22 PROCEDURE — 85025 COMPLETE CBC W/AUTO DIFF WBC: CPT | Performed by: NURSE PRACTITIONER

## 2021-06-22 PROCEDURE — 36415 COLL VENOUS BLD VENIPUNCTURE: CPT | Performed by: NURSE PRACTITIONER

## 2021-06-23 LAB
ALBUMIN SERPL-MCNC: 4.7 G/DL (ref 3.9–5)
ALP SERPL-CCNC: 43 IU/L (ref 48–121)
ALT SERPL-CCNC: 8 IU/L (ref 0–32)
AST SERPL-CCNC: 15 IU/L (ref 0–40)
BILIRUB DIRECT SERPL-MCNC: 0.08 MG/DL (ref 0–0.4)
BILIRUB SERPL-MCNC: 0.3 MG/DL (ref 0–1.2)
PROT SERPL-MCNC: 6.9 G/DL (ref 6–8.5)

## 2021-07-14 ENCOUNTER — TELEPHONE (OUTPATIENT)
Dept: GASTROENTEROLOGY | Facility: CLINIC | Age: 26
End: 2021-07-14

## 2021-07-14 PROCEDURE — U0003 INFECTIOUS AGENT DETECTION BY NUCLEIC ACID (DNA OR RNA); SEVERE ACUTE RESPIRATORY SYNDROME CORONAVIRUS 2 (SARS-COV-2) (CORONAVIRUS DISEASE [COVID-19]), AMPLIFIED PROBE TECHNIQUE, MAKING USE OF HIGH THROUGHPUT TECHNOLOGIES AS DESCRIBED BY CMS-2020-01-R: HCPCS | Performed by: FAMILY MEDICINE

## 2021-07-14 NOTE — TELEPHONE ENCOUNTER
Patient called c/o chest congestion and lack of sense of smell for four days. She has no fever.  She plans to go to the urgent care to get checked for covid  at 1:00 today and will call me back. She is due for Stelara on 07/18.  She will discuss with me before getting the injection . pk  Patient tested positive for Covid.  Instructed patient to hold Stelara for now until we discuss with the provider.   Patient has sx of chest congestion, no fever or other sx.    Please advise.   Discussed w patient she can resume Stelara once she is feeling better.  Patient reports she is feeling better and will be out of quarantine tomorrow. cece

## 2021-07-15 ENCOUNTER — TELEPHONE (OUTPATIENT)
Dept: URGENT CARE | Facility: CLINIC | Age: 26
End: 2021-07-15

## 2021-07-15 NOTE — TELEPHONE ENCOUNTER
----- Message from JUANITA Rios sent at 7/15/2021  1:31 PM EDT -----  Left message for patient to return call regarding positive Covid result.

## 2021-07-16 NOTE — TELEPHONE ENCOUNTER
She needs to hold the Stelara until she is feeling better.  If symptoms become severe or she develops any significant shortness of breath, needs to go to ER and may require admission.  Please have her call us back next week with an update.

## 2021-10-01 ENCOUNTER — TELEPHONE (OUTPATIENT)
Dept: GASTROENTEROLOGY | Facility: CLINIC | Age: 26
End: 2021-10-01

## 2021-10-01 NOTE — TELEPHONE ENCOUNTER
"Patient calls stating that her UC and \"inflammation\" is acting up today.  Has had watery BMs approx 20 x today.  Patient states that she has \"leftover\" prednisone and is wondering if this would help to use.  Also, she is using OTC hemorrhoid suppositories for anal irritation.      [phone]  "

## 2021-10-04 NOTE — TELEPHONE ENCOUNTER
Advised patient as per below.  Patient states that she is actually feeling better and declines in-office appt. She is already scheduled for a telephone visit with Phyllis on 10/11/21, and she will keep this.

## 2021-10-11 ENCOUNTER — OFFICE VISIT (OUTPATIENT)
Dept: GASTROENTEROLOGY | Facility: CLINIC | Age: 26
End: 2021-10-11

## 2021-10-11 VITALS — WEIGHT: 148 LBS | HEIGHT: 65 IN | BODY MASS INDEX: 24.66 KG/M2

## 2021-10-11 DIAGNOSIS — K51.00 ULCERATIVE PANCOLITIS WITHOUT COMPLICATION (HCC): Primary | ICD-10-CM

## 2021-10-11 PROBLEM — M79.89 LIMB SWELLING: Status: ACTIVE | Noted: 2021-10-11

## 2021-10-11 PROBLEM — R56.9 SEIZURES: Status: ACTIVE | Noted: 2021-10-11

## 2021-10-11 PROBLEM — G40.909 EPILEPSY (HCC): Status: ACTIVE | Noted: 2021-10-11

## 2021-10-11 PROBLEM — K62.5 RECTAL BLEEDING: Status: ACTIVE | Noted: 2021-10-11

## 2021-10-11 PROBLEM — D64.9 ANEMIA: Status: ACTIVE | Noted: 2021-08-12

## 2021-10-11 PROBLEM — K63.9 BOWEL DISEASE: Status: ACTIVE | Noted: 2021-10-11

## 2021-10-11 PROBLEM — K64.9 HEMORRHOIDS: Status: ACTIVE | Noted: 2021-10-11

## 2021-10-11 PROBLEM — R61 NIGHT SWEATS: Status: ACTIVE | Noted: 2021-10-11

## 2021-10-11 PROCEDURE — 99212 OFFICE O/P EST SF 10 MIN: CPT | Performed by: NURSE PRACTITIONER

## 2021-10-11 NOTE — PROGRESS NOTES
Chief Complaint   Patient presents with   • Ulcerative Colitis     FU for Stelara          History of Present Illness  26-year-old female presents today for follow-up.  Last visit June 9, 2021.  She has a history of ulcerative colitis diagnosed January 2012, previously followed by Dr. Akins.  She has transferred her care to Dr. Chowdhury.  She was hospitalized January 26 through January 30, 2021.  CT scan and colonoscopy were performed during hospitalization.  She was discharged on prednisone and recommended to start on Ustekinumab.  Her first infusion was March 24, 2021.  Her first subcutaneous injection was May 19, 2021.    She continues on Stelara subcu injections every 8 weeks. She reports compliance with her regimen. She took her last injection September 18, 2021.     She had change in symptoms after eating [employer] with frequent watery bowel movements for three days. She did not have rectal bleeding. Symptoms self resolved.     Since her last visit, patient tested positive for COVID-19 July 2021.    At her last visit symptoms of fatigue and GI symptoms had improved except for rectal bleeding. She denies tenesmus, urgency, nocturnal bowel movements or incontinence. She is having formed bowel movements which is new for her.  She is very pleased with the overall improvement in her GI symptoms and bowel movements since starting Stelara.    She is up to date with GYN exams.     Previous treatments include 6-MP, azathioprine, adalimumab, infliximab and sulfasalazine.      You have chosen to receive care through a telephone visit.   Do you consent to use a telephone visit for your medical care today? Yes    Review of Systems      Result Review :      CBC & Differential (06/22/2021 11:51)   Hepatic Function Panel (06/22/2021 11:51)       Physical Exam - TELEPHONE VISIT      Assessment and Plan    There are no diagnoses linked to this encounter.   Suspect recent change in bowel habits and profuse diarrhea related  to foodborne illness, resolved.    Longstanding ulcerative colitis, chronic, stable, improved, continue Stelara subcu injections every 8 weeks, support and reassurance provided.    High risk medication monitoring reviewed, blood work June 2021 reviewed and up-to-date.    Plans for blood work November or December 2021.  Patient will contact the office after she has obtained insurance and will be happy to proceed with blood work, recommend blood work every 6 months while on biologic therapy.    Recommend endoscopic evaluation approximately 9 months after starting Ustekinumab January 2022. Tentative plans for endoscopic evaluation next year however if insurance coverage is an issue, and patient is still having good control of symptoms, we can defer otherwise patient is agreeable to contact the office and we can proceed with endoscopic evaluation and blood work based on insurance status.      Follow-up in 6 months, sooner if symptoms change or condition warrants.        This visit has been rescheduled as a phone visit to comply with patient safety concerns in accordance with CDC recommendations. Total time of discussion was 9 minutes.    EMR Dragon/Transcription Disclaimer:  This document has been Dictated utilizing Dragon dictation.

## 2021-11-12 DIAGNOSIS — K51.00 ULCERATIVE PANCOLITIS WITHOUT COMPLICATION (HCC): Primary | ICD-10-CM

## 2021-11-12 RX ORDER — USTEKINUMAB 90 MG/ML
INJECTION, SOLUTION SUBCUTANEOUS
Qty: 1 ML | Refills: 2 | Status: SHIPPED | OUTPATIENT
Start: 2021-11-12 | End: 2022-05-17 | Stop reason: SDUPTHER

## 2022-03-01 ENCOUNTER — TELEPHONE (OUTPATIENT)
Dept: GASTROENTEROLOGY | Facility: CLINIC | Age: 27
End: 2022-03-01

## 2022-03-01 NOTE — TELEPHONE ENCOUNTER
Patient called.  Unable to reach patient as her mail box is full.  pk   03/02/2022  [facility] pk

## 2022-04-11 ENCOUNTER — TELEPHONE (OUTPATIENT)
Dept: GASTROENTEROLOGY | Facility: CLINIC | Age: 27
End: 2022-04-11

## 2022-04-11 DIAGNOSIS — K52.9 INFLAMMATORY BOWEL DISEASE: Primary | ICD-10-CM

## 2022-04-11 DIAGNOSIS — Z79.899 HIGH RISK MEDICATION USE: ICD-10-CM

## 2022-04-11 NOTE — TELEPHONE ENCOUNTER
Spoke with patient via telephone.  She is doing well from an IBD standpoint.  She continues on Stelara.  She is without medical insurance.  We canceled today's appointment as she is doing well and scheduled for 2 months to help decrease healthcare cost and out-of-pocket charges.  Will mail order for CBC and hepatic function panel to home address.  Patient is agreeable to contact the office if she has any change in symptoms or may be of any additional assistance prior to follow-up visit.  Ritu

## 2022-04-18 ENCOUNTER — LAB (OUTPATIENT)
Dept: LAB | Facility: HOSPITAL | Age: 27
End: 2022-04-18

## 2022-04-18 ENCOUNTER — TRANSCRIBE ORDERS (OUTPATIENT)
Dept: LAB | Facility: HOSPITAL | Age: 27
End: 2022-04-18

## 2022-04-18 DIAGNOSIS — Z79.899 ENCOUNTER FOR LONG-TERM (CURRENT) USE OF OTHER MEDICATIONS: ICD-10-CM

## 2022-04-18 DIAGNOSIS — K52.9 INFLAMMATORY BOWEL DISEASE: Primary | ICD-10-CM

## 2022-04-18 DIAGNOSIS — K52.9 INFLAMMATORY BOWEL DISEASE: ICD-10-CM

## 2022-04-18 LAB
BASOPHILS # BLD AUTO: 0.03 10*3/MM3 (ref 0–0.2)
BASOPHILS NFR BLD AUTO: 0.6 % (ref 0–1.5)
DEPRECATED RDW RBC AUTO: 41.7 FL (ref 37–54)
EOSINOPHIL # BLD AUTO: 0.17 10*3/MM3 (ref 0–0.4)
EOSINOPHIL NFR BLD AUTO: 3.5 % (ref 0.3–6.2)
ERYTHROCYTE [DISTWIDTH] IN BLOOD BY AUTOMATED COUNT: 11.8 % (ref 12.3–15.4)
HCT VFR BLD AUTO: 40.8 % (ref 34–46.6)
HGB BLD-MCNC: 13.6 G/DL (ref 12–15.9)
IMM GRANULOCYTES # BLD AUTO: 0.01 10*3/MM3 (ref 0–0.05)
IMM GRANULOCYTES NFR BLD AUTO: 0.2 % (ref 0–0.5)
LYMPHOCYTES # BLD AUTO: 1.71 10*3/MM3 (ref 0.7–3.1)
LYMPHOCYTES NFR BLD AUTO: 34.8 % (ref 19.6–45.3)
MCH RBC QN AUTO: 32.1 PG (ref 26.6–33)
MCHC RBC AUTO-ENTMCNC: 33.3 G/DL (ref 31.5–35.7)
MCV RBC AUTO: 96.2 FL (ref 79–97)
MONOCYTES # BLD AUTO: 0.29 10*3/MM3 (ref 0.1–0.9)
MONOCYTES NFR BLD AUTO: 5.9 % (ref 5–12)
NEUTROPHILS NFR BLD AUTO: 2.7 10*3/MM3 (ref 1.7–7)
NEUTROPHILS NFR BLD AUTO: 55 % (ref 42.7–76)
NRBC BLD AUTO-RTO: 0 /100 WBC (ref 0–0.2)
PLATELET # BLD AUTO: 226 10*3/MM3 (ref 140–450)
PMV BLD AUTO: 11.7 FL (ref 6–12)
RBC # BLD AUTO: 4.24 10*6/MM3 (ref 3.77–5.28)
WBC NRBC COR # BLD: 4.91 10*3/MM3 (ref 3.4–10.8)

## 2022-04-18 PROCEDURE — 80076 HEPATIC FUNCTION PANEL: CPT | Performed by: NURSE PRACTITIONER

## 2022-04-18 PROCEDURE — 36415 COLL VENOUS BLD VENIPUNCTURE: CPT | Performed by: NURSE PRACTITIONER

## 2022-04-18 PROCEDURE — 85025 COMPLETE CBC W/AUTO DIFF WBC: CPT

## 2022-04-19 LAB
ALBUMIN SERPL-MCNC: 4.6 G/DL (ref 3.9–5)
ALP SERPL-CCNC: 42 IU/L (ref 44–121)
ALT SERPL-CCNC: 11 IU/L (ref 0–32)
AST SERPL-CCNC: 20 IU/L (ref 0–40)
BILIRUB DIRECT SERPL-MCNC: 0.11 MG/DL (ref 0–0.4)
BILIRUB SERPL-MCNC: 0.3 MG/DL (ref 0–1.2)
PROT SERPL-MCNC: 6.9 G/DL (ref 6–8.5)

## 2022-05-17 DIAGNOSIS — K51.00 ULCERATIVE PANCOLITIS WITHOUT COMPLICATION: ICD-10-CM

## 2022-05-17 RX ORDER — USTEKINUMAB 90 MG/ML
1 INJECTION, SOLUTION SUBCUTANEOUS
Qty: 1 ML | Refills: 2 | Status: SHIPPED | OUTPATIENT
Start: 2022-05-17

## 2022-05-18 ENCOUNTER — TELEPHONE (OUTPATIENT)
Dept: GASTROENTEROLOGY | Facility: CLINIC | Age: 27
End: 2022-05-18

## 2022-05-18 NOTE — TELEPHONE ENCOUNTER
[facility] regarding insurance.  Need copy of card front and back.    Patient gets financial assistance through [employer].  She will need to reapply for the program.  cece

## 2022-06-08 ENCOUNTER — TELEPHONE (OUTPATIENT)
Dept: GASTROENTEROLOGY | Facility: CLINIC | Age: 27
End: 2022-06-08

## 2022-07-12 ENCOUNTER — TELEPHONE (OUTPATIENT)
Dept: GASTROENTEROLOGY | Facility: CLINIC | Age: 27
End: 2022-07-12

## 2022-07-13 NOTE — TELEPHONE ENCOUNTER
"Noted that paperwork had been completed and faxed to [employer] [phone] on 5-24-22. I called and spoke with a representative with [employer], and she stated that the paper they received was \"blank\" and that no other paperwork had been faxed for this patient. I verified that this is the paper they need and I verified the fax number; I re-faxed the paper. I also made note on the form that the patient currently does not have insurance. I informed the patient of what's going on. Will continue to follow up. lb  "
Pt left a voicemail 7/12 at 11:55am inquiring if Nathalie had sent in paperwork to her foundation for her Stelara. Please advise.  
For information on Fall & Injury Prevention, visit [email or web address]

## 2022-11-02 ENCOUNTER — PATIENT MESSAGE (OUTPATIENT)
Dept: GASTROENTEROLOGY | Facility: CLINIC | Age: 27
End: 2022-11-02

## 2022-11-08 ENCOUNTER — TELEPHONE (OUTPATIENT)
Dept: GASTROENTEROLOGY | Facility: CLINIC | Age: 27
End: 2022-11-08

## 2022-11-08 NOTE — TELEPHONE ENCOUNTER
----- Message from Nicole Herrera MA sent at 11/2/2022  2:30 PM EDT -----  Regarding: FW: Not able to recieve medication   Contact: [phone]  Regarding Stelara injection  ----- Message -----  From: Rajeev Mendoza  Sent: 11/2/2022   2:27 PM EDT  To: [facility]  Subject: Not able to recieve medication                   Hey , I'm so sorry to bother you. I feel like every time I reach out I need something. I've been going back and forth with [employer] for 2 months. Which I haven't received my shot for 2 months. I'm not doing this intentionally,  I have tried to talk to them and renew everything for my application. But [employer] rejected my application. So now I have no way of receiving my shots till January 1st. Which is when my new insurance kicks in. If you could just give me advice on what I need to do till then. I can tell my belly is off, expecially since I haven't had my medication.   Thanks !     Gave patient a Stelara 90mg sample on 11/04/2022.  pk

## 2023-03-21 ENCOUNTER — TELEPHONE (OUTPATIENT)
Dept: GASTROENTEROLOGY | Facility: CLINIC | Age: 28
End: 2023-03-21
Payer: COMMERCIAL

## 2023-03-21 NOTE — TELEPHONE ENCOUNTER
Patient called today wanting to know if she needed an office appointment or just schedule a CLS. Last CLS 01/29/2021 with Dr Gutierrez. Please advise and I can call patient to let her know Thank You KS    3/21/2023: Based on review of her chart, we have not seen her in the office since October 11, 2021.  She needs an office visit yearly for ulcerative pancolitis, medication monitoring.    She is also due for colonoscopy for monitoring of ulcerative pancolitis.  I left message on patient's voicemail to return phone call to discuss.    Also based on her insurance it does not appear that she would be available for telephone or video visits unless she is able to check with them and that has changed.  Otherwise she would need an in person visit, this could be before or after colonoscopy but she must have an office visit this year.    Thank you.    Ritu

## 2023-03-28 ENCOUNTER — PREP FOR SURGERY (OUTPATIENT)
Dept: SURGERY | Facility: SURGERY CENTER | Age: 28
End: 2023-03-28
Payer: COMMERCIAL

## 2023-03-28 ENCOUNTER — LAB (OUTPATIENT)
Dept: LAB | Facility: HOSPITAL | Age: 28
End: 2023-03-28
Payer: COMMERCIAL

## 2023-03-28 ENCOUNTER — OFFICE VISIT (OUTPATIENT)
Dept: GASTROENTEROLOGY | Facility: CLINIC | Age: 28
End: 2023-03-28
Payer: COMMERCIAL

## 2023-03-28 VITALS
TEMPERATURE: 97.3 F | HEIGHT: 65 IN | HEART RATE: 69 BPM | BODY MASS INDEX: 25.21 KG/M2 | SYSTOLIC BLOOD PRESSURE: 110 MMHG | WEIGHT: 151.3 LBS | OXYGEN SATURATION: 98 % | DIASTOLIC BLOOD PRESSURE: 70 MMHG

## 2023-03-28 DIAGNOSIS — K51.00 ULCERATIVE PANCOLITIS WITHOUT COMPLICATION: Primary | ICD-10-CM

## 2023-03-28 DIAGNOSIS — Z79.899 HIGH RISK MEDICATION USE: ICD-10-CM

## 2023-03-28 DIAGNOSIS — K51.00 ULCERATIVE PANCOLITIS WITHOUT COMPLICATION: ICD-10-CM

## 2023-03-28 DIAGNOSIS — K62.5 BLOOD PER RECTUM: Primary | ICD-10-CM

## 2023-03-28 LAB
ALBUMIN SERPL-MCNC: 4.3 G/DL (ref 3.5–5.2)
ALP SERPL-CCNC: 57 U/L (ref 39–117)
ALT SERPL W P-5'-P-CCNC: 8 U/L (ref 1–33)
AST SERPL-CCNC: 14 U/L (ref 1–32)
BILIRUB CONJ SERPL-MCNC: <0.2 MG/DL (ref 0–0.3)
BILIRUB INDIRECT SERPL-MCNC: NORMAL MG/DL
BILIRUB SERPL-MCNC: 0.3 MG/DL (ref 0–1.2)
DEPRECATED RDW RBC AUTO: 40.1 FL (ref 37–54)
ERYTHROCYTE [DISTWIDTH] IN BLOOD BY AUTOMATED COUNT: 11.9 % (ref 12.3–15.4)
HCT VFR BLD AUTO: 40.5 % (ref 34–46.6)
HGB BLD-MCNC: 14.2 G/DL (ref 12–15.9)
MCH RBC QN AUTO: 32.3 PG (ref 26.6–33)
MCHC RBC AUTO-ENTMCNC: 35.1 G/DL (ref 31.5–35.7)
MCV RBC AUTO: 92.3 FL (ref 79–97)
PLATELET # BLD AUTO: 254 10*3/MM3 (ref 140–450)
PMV BLD AUTO: 10.6 FL (ref 6–12)
PROT SERPL-MCNC: 7.3 G/DL (ref 6–8.5)
RBC # BLD AUTO: 4.39 10*6/MM3 (ref 3.77–5.28)
WBC NRBC COR # BLD: 7.04 10*3/MM3 (ref 3.4–10.8)

## 2023-03-28 PROCEDURE — 80076 HEPATIC FUNCTION PANEL: CPT | Performed by: NURSE PRACTITIONER

## 2023-03-28 PROCEDURE — 99214 OFFICE O/P EST MOD 30 MIN: CPT | Performed by: NURSE PRACTITIONER

## 2023-03-28 PROCEDURE — 36415 COLL VENOUS BLD VENIPUNCTURE: CPT | Performed by: NURSE PRACTITIONER

## 2023-03-28 PROCEDURE — 85027 COMPLETE CBC AUTOMATED: CPT | Performed by: NURSE PRACTITIONER

## 2023-03-28 RX ORDER — SODIUM CHLORIDE 0.9 % (FLUSH) 0.9 %
10 SYRINGE (ML) INJECTION AS NEEDED
OUTPATIENT
Start: 2023-03-28

## 2023-03-28 RX ORDER — SODIUM CHLORIDE 0.9 % (FLUSH) 0.9 %
3 SYRINGE (ML) INJECTION EVERY 12 HOURS SCHEDULED
OUTPATIENT
Start: 2023-03-28

## 2023-03-28 RX ORDER — SODIUM CHLORIDE, SODIUM LACTATE, POTASSIUM CHLORIDE, CALCIUM CHLORIDE 600; 310; 30; 20 MG/100ML; MG/100ML; MG/100ML; MG/100ML
30 INJECTION, SOLUTION INTRAVENOUS CONTINUOUS PRN
OUTPATIENT
Start: 2023-03-28

## 2023-03-28 NOTE — PATIENT INSTRUCTIONS
Ulcerative pancolitis, stable, continue ustekinumab every 8 weeks.     Ulcerative pancolitis, due for blood work for medication monitoring, orders placed.    Ulcerative pancolitis, due for colonoscopy, orders placed    Follow up visit yearly, sooner if symptoms symptoms change or condition warrants.

## 2023-03-28 NOTE — PROGRESS NOTES
"Chief Complaint   Patient presents with   • Follow-up     Ulcerative colitis           History of Present Illness  27-year-old female presents today for follow-up.  Last visit October 11, 2021.  She has a history of ulcerative pancolitis diagnosed January 2012 previously followed by Dr. Akins.  She has transferred care to Dr. Chowdhury.  Last colonoscopy January 2021.  She continues on ustekinumab, first infusion March 24, 2021.    Her first subcu maintenance injection was May 19, 2021    She reports compliance with ustekinumab subcu injections every 8 weeks.  Her last injection was March 18, 2023.  She denies change in symptoms prior to or after her infusions.    Bowel movements are soft, denies urgency, incontinence, rectal bleeding.    No unintentional weight loss.    Previous treatments include 6-MP, azathioprine, adalimumab, infliximab and sulfasalazine.    Review of Systems      Result Review :         COLONOSCOPY (01/29/2021 09:20)   Hepatic Function Panel (04/18/2022 10:58)   CBC & Differential (04/18/2022 10:58)     Vital Signs:   /70   Pulse 69   Temp 97.3 °F (36.3 °C)   Ht 165.1 cm (65\")   Wt 68.6 kg (151 lb 4.8 oz)   SpO2 98%   BMI 25.18 kg/m²     Body mass index is 25.18 kg/m².     Physical Exam  Vitals reviewed.   Constitutional:       General: She is not in acute distress.     Appearance: Normal appearance. She is normal weight. She is not ill-appearing.   Abdominal:      General: Bowel sounds are normal. There is no distension.      Palpations: Abdomen is soft. Abdomen is not rigid. There is no pulsatile mass.      Tenderness: There is no abdominal tenderness. There is no guarding or rebound.   Neurological:      Mental Status: She is alert.         Assessment and Plan    Diagnoses and all orders for this visit:    1. Ulcerative pancolitis without complication (HCC) (Primary)  -     CBC (No Diff)  -     Hepatic Function Panel    2. High risk medication use  -     CBC (No Diff)  -     " Hepatic Function Panel         Ulcerative pancolitis, stable, continue ustekinumab every 8 weeks.     Ulcerative pancolitis, due for blood work for medication monitoring, orders placed.    Ulcerative pancolitis, due for colonoscopy, orders placed    Follow up visit yearly, sooner if symptoms symptoms change or condition warrants.         Patient Instructions   Ulcerative pancolitis, stable, continue ustekinumab every 8 weeks.     Ulcerative pancolitis, due for blood work for medication monitoring, orders placed.    Ulcerative pancolitis, due for colonoscopy, orders placed    Follow up visit yearly, sooner if symptoms symptoms change or condition warrants.          EMR Dragon/Transcription Disclaimer:  This document has been Dictated utilizing Dragon dictation.

## 2023-06-07 NOTE — SIGNIFICANT NOTE
Education provided the Patient on the following:    - Nothing to Eat or Drink after MN the night before the procedure    - Avoid red/purple fluids while completing their bowel prep as ordered by physician  -Contact Gastrointerologist office for any questions about specific details regarding colon prep    -You will need to have someone drive you home after your colonoscopy and remain with you for 24 hours after the procedure  - The date of your Surgery, you may have one visitor at bedside or within 10-15 minutes of [facility]  -Please wear warm socks when you arrive for your colonoscopy  -Remove all jewelry and leave any valuables before arriving the day of your procedure (all will have to be removed before leaving preop)  -You will need to arrive at 0630 on 6/9 for your colonoscopy    -Feel free to contact us at: [phone] with any additional questions/concerns

## 2023-06-09 ENCOUNTER — ANESTHESIA EVENT (OUTPATIENT)
Dept: SURGERY | Facility: SURGERY CENTER | Age: 28
End: 2023-06-09
Payer: COMMERCIAL

## 2023-06-09 ENCOUNTER — HOSPITAL ENCOUNTER (OUTPATIENT)
Facility: SURGERY CENTER | Age: 28
Setting detail: HOSPITAL OUTPATIENT SURGERY
Discharge: HOME | End: 2023-06-09
Attending: INTERNAL MEDICINE | Admitting: INTERNAL MEDICINE
Payer: COMMERCIAL

## 2023-06-09 ENCOUNTER — ANESTHESIA (OUTPATIENT)
Dept: SURGERY | Facility: SURGERY CENTER | Age: 28
End: 2023-06-09
Payer: COMMERCIAL

## 2023-06-09 VITALS
DIASTOLIC BLOOD PRESSURE: 71 MMHG | BODY MASS INDEX: 26.02 KG/M2 | HEIGHT: 65 IN | WEIGHT: 156.2 LBS | SYSTOLIC BLOOD PRESSURE: 112 MMHG | RESPIRATION RATE: 16 BRPM | HEART RATE: 71 BPM | OXYGEN SATURATION: 100 % | TEMPERATURE: 98.4 F

## 2023-06-09 DIAGNOSIS — K51.00 ULCERATIVE PANCOLITIS WITHOUT COMPLICATION: ICD-10-CM

## 2023-06-09 LAB
B-HCG UR QL: NEGATIVE
EXPIRATION DATE: NORMAL
INTERNAL NEGATIVE CONTROL: NEGATIVE
INTERNAL POSITIVE CONTROL: POSITIVE
Lab: NORMAL

## 2023-06-09 PROCEDURE — 45380 COLONOSCOPY AND BIOPSY: CPT | Performed by: INTERNAL MEDICINE

## 2023-06-09 PROCEDURE — 81025 URINE PREGNANCY TEST: CPT | Performed by: NURSE PRACTITIONER

## 2023-06-09 PROCEDURE — 88305 TISSUE EXAM BY PATHOLOGIST: CPT | Performed by: INTERNAL MEDICINE

## 2023-06-09 PROCEDURE — 25010000002 PROPOFOL 10 MG/ML EMULSION: Performed by: ANESTHESIOLOGY

## 2023-06-09 RX ORDER — LIDOCAINE HYDROCHLORIDE 20 MG/ML
INJECTION, SOLUTION INFILTRATION; PERINEURAL AS NEEDED
Status: DISCONTINUED | OUTPATIENT
Start: 2023-06-09 | End: 2023-06-09 | Stop reason: SURG

## 2023-06-09 RX ORDER — SODIUM CHLORIDE, SODIUM LACTATE, POTASSIUM CHLORIDE, CALCIUM CHLORIDE 600; 310; 30; 20 MG/100ML; MG/100ML; MG/100ML; MG/100ML
30 INJECTION, SOLUTION INTRAVENOUS CONTINUOUS PRN
Status: DISCONTINUED | OUTPATIENT
Start: 2023-06-09 | End: 2023-06-09 | Stop reason: HOSPADM

## 2023-06-09 RX ORDER — SODIUM CHLORIDE 0.9 % (FLUSH) 0.9 %
10 SYRINGE (ML) INJECTION AS NEEDED
Status: DISCONTINUED | OUTPATIENT
Start: 2023-06-09 | End: 2023-06-09 | Stop reason: HOSPADM

## 2023-06-09 RX ORDER — SODIUM CHLORIDE 0.9 % (FLUSH) 0.9 %
3 SYRINGE (ML) INJECTION EVERY 12 HOURS SCHEDULED
Status: DISCONTINUED | OUTPATIENT
Start: 2023-06-09 | End: 2023-06-09 | Stop reason: HOSPADM

## 2023-06-09 RX ORDER — PROPOFOL 10 MG/ML
VIAL (ML) INTRAVENOUS AS NEEDED
Status: DISCONTINUED | OUTPATIENT
Start: 2023-06-09 | End: 2023-06-09 | Stop reason: SURG

## 2023-06-09 RX ADMIN — LIDOCAINE HYDROCHLORIDE 40 MG: 20 INJECTION, SOLUTION INFILTRATION; PERINEURAL at 07:58

## 2023-06-09 RX ADMIN — SODIUM CHLORIDE, POTASSIUM CHLORIDE, SODIUM LACTATE AND CALCIUM CHLORIDE 30 ML/HR: 600; 310; 30; 20 INJECTION, SOLUTION INTRAVENOUS at 06:59

## 2023-06-09 RX ADMIN — PROPOFOL 100 MG: 10 INJECTION, EMULSION INTRAVENOUS at 07:58

## 2023-06-09 RX ADMIN — PROPOFOL 160 MCG/KG/MIN: 10 INJECTION, EMULSION INTRAVENOUS at 07:59

## 2023-06-09 NOTE — ANESTHESIA PREPROCEDURE EVALUATION
Anesthesia Evaluation     Patient summary reviewed and Nursing notes reviewed   NPO Solid Status: > 8 hours  NPO Liquid Status: > 2 hours           Airway   Mallampati: I  TM distance: >3 FB  Neck ROM: full  No difficulty expected  Dental - normal exam     Pulmonary - negative pulmonary ROS and normal exam   Cardiovascular - negative cardio ROS and normal exam  Exercise tolerance: good (4-7 METS)        Neuro/Psych  (+) seizures well controlled, psychiatric history Depression  GI/Hepatic/Renal/Endo    (+) GERD, GI bleeding lower resolved    Musculoskeletal (-) negative ROS    Abdominal  - normal exam    Bowel sounds: normal.   Substance History - negative use     OB/GYN negative ob/gyn ROS         Other                      Anesthesia Plan    ASA 2     MAC     intravenous induction     Anesthetic plan, risks, benefits, and alternatives have been provided, discussed and informed consent has been obtained with: patient.  Pre-procedure education provided    CODE STATUS:

## 2023-06-09 NOTE — ANESTHESIA POSTPROCEDURE EVALUATION
Patient: Rajeev Mendoza    Procedure Summary       Date: 06/09/23 Room / Location[facility]    Anesthesia Start: 0756 Anesthesia Stop: 0819    Procedure: COLONOSCOPY Diagnosis:       Ulcerative pancolitis without complication      (Ulcerative pancolitis without complication (HCC) [K51.00])    Surgeons: Ivan Chowdhury MD Provider: Blane Garcia MD    Anesthesia Type: MAC ASA Status: 2            Anesthesia Type: MAC    Vitals  Vitals Value Taken Time   /71 06/09/23 0837   Temp 36.9 °C (98.4 °F) 06/09/23 0820   Pulse 72 06/09/23 0839   Resp 16 06/09/23 0835   SpO2 100 % 06/09/23 0839   Vitals shown include unvalidated device data.        Post Anesthesia Care and Evaluation    Patient location during evaluation: bedside  Patient participation: complete - patient participated  Level of consciousness: awake  Pain management: adequate    Airway patency: patent  Anesthetic complications: No anesthetic complications  PONV Status: none  Cardiovascular status: acceptable  Respiratory status: acceptable  Hydration status: acceptable  Post Neuraxial Block status: Motor and sensory function returned to baseline

## 2023-06-09 NOTE — H&P
No chief complaint on file.      HPI  UC         Problem List:    Patient Active Problem List   Diagnosis    Blood per rectum    Hypokalemia    Iron deficiency    Inflammatory bowel disease    Anxiety and depression    Generalized abdominal pain    Ulcerative colitis    Anemia    Bowel disease    Epilepsy    Hemorrhoids    Limb swelling    Night sweats    Seizures    Rectal bleeding       Medical History:    Past Medical History:   Diagnosis Date    Anemia     Anxiety and depression     Colon polyp     GERD (gastroesophageal reflux disease)     rare    GI (gastrointestinal bleed)     Irritable bowel syndrome     Seizures     childhood no longer a problem, resolved    Ulcerative colitis         Social History:    Social History     Socioeconomic History    Marital status:    Tobacco Use    Smoking status: Former     Packs/day: 0.00     Years: 0.00     Pack years: 0.00     Types: Cigarettes     Quit date:      Years since quitting: 10.4    Smokeless tobacco: Never   Vaping Use    Vaping Use: Never used   Substance and Sexual Activity    Alcohol use: Not Currently     Alcohol/week: 0.0 standard drinks     Comment: Socially    Drug use: Never    Sexual activity: Yes     Partners: Male     Birth control/protection: I.U.D.       Family History:   Family History   Problem Relation Age of Onset    Fibromyalgia Mother     Seizures Mother     Diabetes Mother     Hyperlipidemia Mother     Irritable bowel syndrome Mother     Pancreatitis Mother     Ulcerative colitis Paternal Uncle     Colon cancer Neg Hx     Colon polyps Neg Hx     Crohn's disease Neg Hx        Surgical History:   Past Surgical History:   Procedure Laterality Date     SECTION      COLONOSCOPY N/A 2021    Procedure: COLONOSCOPY TO CECUM WITH COLD BIOPSIES, POLYPECTOMIES;  Surgeon: Joaquin Ko MD;  [facility] ENDOSCOPY;  Service: Gastroenterology;  Laterality: N/A;  PRE-RECTAL BLEEDING  POST- COLITIS, POLYPS     KNEE SURGERY Left          Current Facility-Administered Medications:     lactated ringers infusion, 30 mL/hr, Intravenous, Continuous PRN, Karuna Nunes C, APRN, Last Rate: 30 mL/hr at 06/09/23 0659, 30 mL/hr at 06/09/23 0659    sodium chloride 0.9 % flush 10 mL, 10 mL, Intravenous, PRN, Karen Nunesey C, APRN    sodium chloride 0.9 % flush 3 mL, 3 mL, Intravenous, Q12H, Karuna Nunes C, APRN    Allergies: No Known Allergies     The following portions of the patient's history were reviewed by me and updated as appropriate: review of systems, allergies, current medications, past family history, past medical history, past social history, past surgical history and problem list.    Vitals:    06/09/23 0650   BP: 120/75   Pulse: 76   Resp: 16   Temp: 97.8 °F (36.6 °C)   SpO2: 98%       PHYSICAL EXAM:    CONSTITUTIONAL:  today's vital signs reviewed by me  GASTROINTESTINAL: abdomen is soft nontender nondistended with normal active bowel sounds, no masses are appreciated    Assessment/ Plan  UC  colonoscopy    Risks and benefits as well as alternatives to endoscopic evaluation were explained to the patient and they voiced understanding and wish to proceed.  These risks include but are not limited to the risk of bleeding, perforation, adverse reaction to sedation, and missed lesions.  The patient was given the opportunity to ask questions prior to the endoscopic procedure.

## 2023-06-12 LAB
LAB AP CASE REPORT: NORMAL
LAB AP CLINICAL INFORMATION: NORMAL
PATH REPORT.FINAL DX SPEC: NORMAL
PATH REPORT.GROSS SPEC: NORMAL

## 2023-06-19 DIAGNOSIS — K51.00 ULCERATIVE PANCOLITIS WITHOUT COMPLICATION: ICD-10-CM

## 2023-06-19 RX ORDER — USTEKINUMAB 90 MG/ML
INJECTION, SOLUTION SUBCUTANEOUS
Qty: 1 ML | Refills: 3 | Status: SHIPPED | OUTPATIENT
Start: 2023-06-19 | End: 2023-06-20 | Stop reason: SDUPTHER

## 2023-09-18 ENCOUNTER — SPECIALTY PHARMACY (OUTPATIENT)
Dept: GASTROENTEROLOGY | Facility: CLINIC | Age: 28
End: 2023-09-18
Payer: COMMERCIAL

## 2023-09-27 ENCOUNTER — OFFICE VISIT (OUTPATIENT)
Dept: GASTROENTEROLOGY | Facility: CLINIC | Age: 28
End: 2023-09-27
Payer: COMMERCIAL

## 2023-09-27 VITALS
SYSTOLIC BLOOD PRESSURE: 114 MMHG | HEIGHT: 65 IN | OXYGEN SATURATION: 99 % | HEART RATE: 76 BPM | BODY MASS INDEX: 27.32 KG/M2 | DIASTOLIC BLOOD PRESSURE: 70 MMHG | WEIGHT: 164 LBS | TEMPERATURE: 97.5 F

## 2023-09-27 DIAGNOSIS — K62.5 RECTAL BLEEDING: ICD-10-CM

## 2023-09-27 DIAGNOSIS — K51.00 ULCERATIVE PANCOLITIS WITHOUT COMPLICATION: Primary | ICD-10-CM

## 2023-09-27 DIAGNOSIS — R19.8 CHANGE IN BOWEL MOVEMENT: ICD-10-CM

## 2023-09-27 PROCEDURE — 99214 OFFICE O/P EST MOD 30 MIN: CPT | Performed by: NURSE PRACTITIONER

## 2023-09-27 RX ORDER — TRAZODONE HYDROCHLORIDE 50 MG/1
50 TABLET ORAL NIGHTLY PRN
COMMUNITY
Start: 2023-09-05

## 2023-09-27 RX ORDER — MESALAMINE 4 G/60ML
4 SUSPENSION RECTAL SEE ADMIN INSTRUCTIONS
Qty: 30 ENEMA | Refills: 1 | Status: SHIPPED | OUTPATIENT
Start: 2023-09-27

## 2023-09-27 NOTE — PROGRESS NOTES
"Chief Complaint   Patient presents with    Follow-up     Rectal bleeding and change in bowel movements           History of Present Illness  28-year-old female presents today for follow-up.  Last visit March 28, 2023.  Last colonoscopy June 9, 2023.  She has a history of pan ulcerative colitis diagnosed 2012 previously followed by Dr. Akins.    She continues on ustekinumab, this was initiated March 24, 2021.  She is due for her next ustekinumab subcu injection October 18, 2023.    She noticed a change in symptoms August 2023 with harder stools than diarrhea.  Also blood on toilet tissue when wiping and increased abdominal cramping.  She started fiber supplement and has been having a bowel movement daily but overall does not feel like she is back to her baseline and continues to experience rectal bleeding.      Blood work with PCP September with low iron stores and low B12  Started Trazodone, iron supplement September 2023  Decreasing celexa, plans to start Pristiq  Result Review :       SCANNED - LABS (09/05/2023)    Tissue Pathology Exam (06/09/2023 08:05)    COLONOSCOPY (06/09/2023 07:45)    Vital Signs:   /70   Pulse 76   Temp 97.5 °F (36.4 °C)   Ht 165.1 cm (65\")   Wt 74.4 kg (164 lb)   SpO2 99%   BMI 27.29 kg/m²     Body mass index is 27.29 kg/m².     Physical Exam  Vitals reviewed.   Constitutional:       General: She is not in acute distress.     Appearance: Normal appearance. She is not ill-appearing.   Eyes:      General: No scleral icterus.  Pulmonary:      Effort: Pulmonary effort is normal. No respiratory distress.   Abdominal:      General: Bowel sounds are normal. There is no distension.      Palpations: Abdomen is soft. Abdomen is not rigid. There is no pulsatile mass.      Tenderness: There is no abdominal tenderness. There is no guarding or rebound.   Skin:     Coloration: Skin is not jaundiced.   Neurological:      Mental Status: She is alert and oriented to person, place, and time. "   Psychiatric:         Thought Content: Thought content normal.         Judgment: Judgment normal.           Assessment and Plan    Diagnoses and all orders for this visit:    1. Ulcerative pancolitis without complication (Primary)  -     mesalamine (ROWASA) 4 g enema; Insert 1 enema into the rectum See Admin Instructions. One enema in rectum nightly, try and retain overnight  Dispense: 30 enema; Refill: 1    2. Rectal bleeding  -     mesalamine (ROWASA) 4 g enema; Insert 1 enema into the rectum See Admin Instructions. One enema in rectum nightly, try and retain overnight  Dispense: 30 enema; Refill: 1    3. Change in bowel movement  -     mesalamine (ROWASA) 4 g enema; Insert 1 enema into the rectum See Admin Instructions. One enema in rectum nightly, try and retain overnight  Dispense: 30 enema; Refill: 1       Colonoscopy June 9, 2023 with scarred mucosa in the entire colon, biopsies benign, no significant histopathologic changes.  Multiple small polyps in the transverse colon consistent with hyperplastic polyps  Entire portion of ileum was normal.    I have encouraged patient to track her bowel movements and any medications needed such as Imodium for the next 2 weeks to see underlying pattern.    Will start enemas once nightly, patient encouraged to retain liquid as long as possible.    Continue Stelara every 8 weeks.    High risk medication monitoring reviewed, blood work up-to-date September 2023.    Reviewed colonoscopy June 2023, no evidence of active inflammatory changes at that time,    Will make additional recommendations based on response to the above interventions and clinical course.          Patient Instructions   Tract bowel movements and the medication such as Imodium or fiber taken for bowel movements for 2 weeks, blank calendar provided.    Start enemas, once nightly, retain liquid as long as possible    Continue Stelara injection every 8 weeks.  Please send a [employer] message with bowel movement  calendar and update regarding rectal bleeding in 2 weeks and I will make additional recommendations.    Reviewed blood work September 2023 from primary care provider, no evidence of anemia, no plans for blood work at this time, continue iron supplement and medication changes with primary care provider.    Reviewed colonoscopy June 2023, no evidence of active inflammatory changes at that time.  We will treat with enemas to see if we can improve hematochezia and rectal bleeding as well as track bowel movements to see if we can find underlying pattern and make additional recommendations that may be more beneficial instead of fiber.        EMR Dragon/Transcription Disclaimer:  This document has been Dictated utilizing Dragon dictation.

## 2023-09-27 NOTE — PATIENT INSTRUCTIONS
Tract bowel movements and the medication such as Imodium or fiber taken for bowel movements for 2 weeks, blank calendar provided.    Start enemas, once nightly, retain liquid as long as possible    Continue Stelara injection every 8 weeks.  Please send a [employer] message with bowel movement calendar and update regarding rectal bleeding in 2 weeks and I will make additional recommendations.    Reviewed blood work September 2023 from primary care provider, no evidence of anemia, no plans for blood work at this time, continue iron supplement and medication changes with primary care provider.    Reviewed colonoscopy June 2023, no evidence of active inflammatory changes at that time.  We will treat with enemas to see if we can improve hematochezia and rectal bleeding as well as track bowel movements to see if we can find underlying pattern and make additional recommendations that may be more beneficial instead of fiber.

## 2023-11-14 ENCOUNTER — PATIENT MESSAGE (OUTPATIENT)
Dept: GASTROENTEROLOGY | Facility: CLINIC | Age: 28
End: 2023-11-14
Payer: COMMERCIAL

## 2023-11-14 DIAGNOSIS — R19.7 DIARRHEA, UNSPECIFIED TYPE: Primary | ICD-10-CM

## 2023-11-14 DIAGNOSIS — R11.2 NAUSEA AND VOMITING, UNSPECIFIED VOMITING TYPE: ICD-10-CM

## 2023-11-27 RX ORDER — ONDANSETRON 4 MG/1
4 TABLET, FILM COATED ORAL EVERY 8 HOURS PRN
Qty: 12 TABLET | Refills: 0 | Status: SHIPPED | OUTPATIENT
Start: 2023-11-27

## 2023-11-27 RX ORDER — DICYCLOMINE HYDROCHLORIDE 10 MG/1
10 CAPSULE ORAL 3 TIMES DAILY PRN
Qty: 60 CAPSULE | Refills: 1 | Status: SHIPPED | OUTPATIENT
Start: 2023-11-27

## 2023-11-27 RX ORDER — PROMETHAZINE HYDROCHLORIDE 12.5 MG/1
12.5 TABLET ORAL EVERY 8 HOURS PRN
Qty: 30 TABLET | Refills: 1 | Status: SHIPPED | OUTPATIENT
Start: 2023-11-27

## 2023-11-27 RX ORDER — PROMETHAZINE HYDROCHLORIDE 25 MG/1
25 SUPPOSITORY RECTAL EVERY 6 HOURS PRN
Qty: 4 SUPPOSITORY | Refills: 0 | Status: SHIPPED | OUTPATIENT
Start: 2023-11-27

## 2024-01-05 ENCOUNTER — SPECIALTY PHARMACY (OUTPATIENT)
Dept: GASTROENTEROLOGY | Facility: CLINIC | Age: 29
End: 2024-01-05
Payer: COMMERCIAL

## 2024-01-05 NOTE — PROGRESS NOTES
Specialty Pharmacy    Stelara PA renewal due 12/1/24     Mary Guzmán  Specialty Pharmacy Technician

## 2024-02-01 ENCOUNTER — TELEPHONE (OUTPATIENT)
Dept: GASTROENTEROLOGY | Facility: CLINIC | Age: 29
End: 2024-02-01
Payer: COMMERCIAL

## 2024-02-01 NOTE — TELEPHONE ENCOUNTER
Notified patient she can  a Stelara sample.  She will apply for financial assistance and will bring in the form for me to complete.  pk

## 2024-02-01 NOTE — TELEPHONE ENCOUNTER
Caller: Rajeev Mendoza    Relationship: Self    Best call back number: [phone]    Which medication are you concerned about: STELARA INJECTION    Who prescribed you this medication: SIOBHAN CHOI    When did you start taking this medication: BEGINNING OF 2023    What are your concerns: PT HAS TO APPLY FOR PAYMENT ASSISTANCE AND IS WORRIED THAT SHE WILL NOT GET APPROVAL PRIOR TO HER INJECTION DUE ON 02.18.24. IF NOT APPROVED BY THEN, PT IS REQUESTING TO GET THE INJECTION AT THE OFFICE. SHE STATES THIS HAS BEEN DONE IN THE PAST. PLEASE CONTACT PT TO ADVISE AND ASSIST.

## 2024-03-18 DIAGNOSIS — K51.00 ULCERATIVE PANCOLITIS WITHOUT COMPLICATION: ICD-10-CM

## 2024-03-18 RX ORDER — USTEKINUMAB 90 MG/ML
INJECTION, SOLUTION SUBCUTANEOUS
Qty: 1 EACH | Refills: 4 | Status: SHIPPED | OUTPATIENT
Start: 2024-03-18

## 2024-07-24 ENCOUNTER — TELEPHONE (OUTPATIENT)
Dept: GASTROENTEROLOGY | Facility: CLINIC | Age: 29
End: 2024-07-24
Payer: COMMERCIAL

## 2024-07-24 NOTE — TELEPHONE ENCOUNTER
Hub staff attempted to follow warm transfer process and was unsuccessful     Caller: Rajeev Mendoza    Relationship to patient: Self    Best call back number: [phone]     Patient is needing: PATIENT CALLED REGARDING A PRIOR AUTH ON THEIR STELARA.

## 2024-07-25 NOTE — TELEPHONE ENCOUNTER
Patient has new insurance.  Waiting on copy of card and will get the PA. Pk  Sent in prior auth to [facility]. pk

## 2024-08-01 DIAGNOSIS — K51.00 ULCERATIVE PANCOLITIS WITHOUT COMPLICATION: Primary | ICD-10-CM

## 2024-08-01 RX ORDER — USTEKINUMAB 90 MG/ML
90 INJECTION, SOLUTION SUBCUTANEOUS
Qty: 1 EACH | Refills: 1 | Status: SHIPPED | OUTPATIENT
Start: 2024-08-01

## 2024-09-16 ENCOUNTER — TELEPHONE (OUTPATIENT)
Dept: GASTROENTEROLOGY | Facility: CLINIC | Age: 29
End: 2024-09-16
Payer: COMMERCIAL

## 2024-09-27 ENCOUNTER — TELEPHONE (OUTPATIENT)
Dept: GASTROENTEROLOGY | Facility: CLINIC | Age: 29
End: 2024-09-27
Payer: COMMERCIAL

## 2024-10-02 ENCOUNTER — TELEPHONE (OUTPATIENT)
Dept: GASTROENTEROLOGY | Facility: CLINIC | Age: 29
End: 2024-10-02
Payer: COMMERCIAL

## 2024-10-02 NOTE — TELEPHONE ENCOUNTER
Gave patient a Stelara 90mg sample.  She will let me know if the insurance issue is not resolved.  pk

## 2024-10-02 NOTE — TELEPHONE ENCOUNTER
Caller Name: Rajeev Mendoza  Relationship: Self  Best Contact Number: [phone]    Patient is requesting:  samples of Ustekinumab (Stelara) 90 MG/ML solution prefilled syringe Injection  How many days of medication do you have left? BEEN WITHOUT IT FOR ABOUT 4 WEEKS.   STILL HAVING INSURANCE ISSUES WITH THE PHARMACY .  SHE IS REQUESTING THAT JEROMY CALL HER BACK

## 2024-11-04 ENCOUNTER — LAB (OUTPATIENT)
Dept: LAB | Facility: HOSPITAL | Age: 29
End: 2024-11-04
Payer: COMMERCIAL

## 2024-11-04 ENCOUNTER — PREP FOR SURGERY (OUTPATIENT)
Dept: SURGERY | Facility: SURGERY CENTER | Age: 29
End: 2024-11-04
Payer: COMMERCIAL

## 2024-11-04 ENCOUNTER — OFFICE VISIT (OUTPATIENT)
Dept: GASTROENTEROLOGY | Facility: CLINIC | Age: 29
End: 2024-11-04
Payer: COMMERCIAL

## 2024-11-04 ENCOUNTER — SPECIALTY PHARMACY (OUTPATIENT)
Dept: GASTROENTEROLOGY | Facility: CLINIC | Age: 29
End: 2024-11-04
Payer: COMMERCIAL

## 2024-11-04 VITALS
OXYGEN SATURATION: 98 % | SYSTOLIC BLOOD PRESSURE: 110 MMHG | WEIGHT: 155.7 LBS | TEMPERATURE: 96 F | DIASTOLIC BLOOD PRESSURE: 70 MMHG | HEIGHT: 65 IN | HEART RATE: 83 BPM | BODY MASS INDEX: 25.94 KG/M2

## 2024-11-04 DIAGNOSIS — K51.00 ULCERATIVE PANCOLITIS WITHOUT COMPLICATION: Primary | ICD-10-CM

## 2024-11-04 DIAGNOSIS — R53.81 MALAISE AND FATIGUE: ICD-10-CM

## 2024-11-04 DIAGNOSIS — R53.83 MALAISE AND FATIGUE: ICD-10-CM

## 2024-11-04 DIAGNOSIS — Z79.899 ON USTEKINUMAB THERAPY: ICD-10-CM

## 2024-11-04 PROBLEM — Z79.620 ON USTEKINUMAB THERAPY: Status: ACTIVE | Noted: 2024-11-04

## 2024-11-04 LAB
25(OH)D3 SERPL-MCNC: 34.7 NG/ML (ref 30–100)
ALBUMIN SERPL-MCNC: 4.6 G/DL (ref 3.5–5.2)
ALP SERPL-CCNC: 52 U/L (ref 39–117)
ALT SERPL W P-5'-P-CCNC: 25 U/L (ref 1–33)
AST SERPL-CCNC: 22 U/L (ref 1–32)
BILIRUB CONJ SERPL-MCNC: <0.2 MG/DL (ref 0–0.3)
BILIRUB INDIRECT SERPL-MCNC: NORMAL MG/DL
BILIRUB SERPL-MCNC: 0.2 MG/DL (ref 0–1.2)
CRP SERPL-MCNC: <0.3 MG/DL (ref 0–0.5)
DEPRECATED RDW RBC AUTO: 41.4 FL (ref 37–54)
ERYTHROCYTE [DISTWIDTH] IN BLOOD BY AUTOMATED COUNT: 12 % (ref 12.3–15.4)
HCT VFR BLD AUTO: 42.5 % (ref 34–46.6)
HGB BLD-MCNC: 14.3 G/DL (ref 12–15.9)
MCH RBC QN AUTO: 31.4 PG (ref 26.6–33)
MCHC RBC AUTO-ENTMCNC: 33.6 G/DL (ref 31.5–35.7)
MCV RBC AUTO: 93.4 FL (ref 79–97)
PLATELET # BLD AUTO: 222 10*3/MM3 (ref 140–450)
PMV BLD AUTO: 10.8 FL (ref 6–12)
PROT SERPL-MCNC: 7.7 G/DL (ref 6–8.5)
RBC # BLD AUTO: 4.55 10*6/MM3 (ref 3.77–5.28)
TSH SERPL DL<=0.05 MIU/L-ACNC: 0.95 UIU/ML (ref 0.27–4.2)
VIT B12 BLD-MCNC: 620 PG/ML (ref 211–946)
WBC NRBC COR # BLD AUTO: 6.48 10*3/MM3 (ref 3.4–10.8)

## 2024-11-04 PROCEDURE — 85027 COMPLETE CBC AUTOMATED: CPT | Performed by: NURSE PRACTITIONER

## 2024-11-04 PROCEDURE — 84443 ASSAY THYROID STIM HORMONE: CPT | Performed by: NURSE PRACTITIONER

## 2024-11-04 PROCEDURE — 82306 VITAMIN D 25 HYDROXY: CPT | Performed by: NURSE PRACTITIONER

## 2024-11-04 PROCEDURE — 80076 HEPATIC FUNCTION PANEL: CPT | Performed by: NURSE PRACTITIONER

## 2024-11-04 PROCEDURE — 86140 C-REACTIVE PROTEIN: CPT | Performed by: NURSE PRACTITIONER

## 2024-11-04 PROCEDURE — 36415 COLL VENOUS BLD VENIPUNCTURE: CPT | Performed by: NURSE PRACTITIONER

## 2024-11-04 PROCEDURE — 82607 VITAMIN B-12: CPT | Performed by: NURSE PRACTITIONER

## 2024-11-04 PROCEDURE — 99214 OFFICE O/P EST MOD 30 MIN: CPT | Performed by: NURSE PRACTITIONER

## 2024-11-04 RX ORDER — USTEKINUMAB 90 MG/ML
90 INJECTION, SOLUTION SUBCUTANEOUS
Qty: 1 EACH | Refills: 5 | Status: SHIPPED | OUTPATIENT
Start: 2024-11-04

## 2024-11-04 RX ORDER — USTEKINUMAB 90 MG/ML
90 INJECTION, SOLUTION SUBCUTANEOUS
Qty: 1 EACH | Refills: 5 | Status: SHIPPED | OUTPATIENT
Start: 2024-11-04 | End: 2024-11-04

## 2024-11-04 RX ORDER — SODIUM CHLORIDE 0.9 % (FLUSH) 0.9 %
3 SYRINGE (ML) INJECTION EVERY 12 HOURS SCHEDULED
OUTPATIENT
Start: 2024-11-04

## 2024-11-04 RX ORDER — SODIUM CHLORIDE 0.9 % (FLUSH) 0.9 %
10 SYRINGE (ML) INJECTION AS NEEDED
OUTPATIENT
Start: 2024-11-04

## 2024-11-04 RX ORDER — SODIUM CHLORIDE, SODIUM LACTATE, POTASSIUM CHLORIDE, CALCIUM CHLORIDE 600; 310; 30; 20 MG/100ML; MG/100ML; MG/100ML; MG/100ML
30 INJECTION, SOLUTION INTRAVENOUS CONTINUOUS PRN
OUTPATIENT
Start: 2024-11-04 | End: 2024-11-05

## 2024-11-04 NOTE — PROGRESS NOTES
Chief Complaint   Patient presents with    Ulcerative Colitis           GASTROENTEROLOGY SUMMARY    Last visit September 27, 2023.     Last colonoscopy June 9, 2023.  She has a history of pan ulcerative colitis diagnosed 2012 previously followed by Dr. Akins.     She continues on ustekinumab, this was initiated March 24, 2021.    Previous treatments:  6-MP, azathioprine, adalimumab, infliximab and sulfasalazine.   History of Present Illness  The patient presents for evaluation of multiple medical concerns.    She reports a positive overall health status.   However, she experienced a delay in her Stelara medication due to an insurance change, resulting in a 4-week gap between doses.   Despite this, she did not experience any adverse effects.   Her last dose was administered on 10/02/2024, and she is now back on her regular 8-week schedule.   She has not had any recent blood work done.    She reports no rectal bleeding and has regular bowel movements, typically 2 to 3 times a day, without any urgency or incontinence.   She does not need to use the bathroom at night and reports no abdominal pain.   Occasionally, she experiences cramping, which she attributes to certain foods, and notes a slight cramping sensation on her lower left side that improves after using the bathroom.   She has not needed to use enemas recently and reports no new skin issues or changes.    She is currently taking Pristiq 25 mg, which she finds effective.   She has discontinued the use of BuSpar.   She is seeking a new gynecologist for her annual exam and IUD check.     She has not seen her primary care physician in 6 months and plans to schedule an appointment for medication refills.   She is also taking trazodone to aid with sleep and is questioning if a hormonal imbalance could be contributing to her sleep difficulties.    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   She has not been informed of any  "vitamin D deficiency.    FAMILY HISTORY  Her mother has vitamin D deficiency.          Result Review :           Vital Signs:   /70   Pulse 83   Temp 96 °F (35.6 °C)   Ht 165.1 cm (65\")   Wt 70.6 kg (155 lb 11.2 oz)   SpO2 98%   BMI 25.91 kg/m²     Body mass index is 25.91 kg/m².     Physical Exam  Vitals reviewed.   Constitutional:       General: She is not in acute distress.     Appearance: Normal appearance. She is not ill-appearing or toxic-appearing.   Eyes:      General: No scleral icterus.  Pulmonary:      Effort: Pulmonary effort is normal. No respiratory distress.   Skin:     Coloration: Skin is not jaundiced.   Neurological:      Mental Status: She is alert and oriented to person, place, and time.   Psychiatric:         Mood and Affect: Mood normal.         Behavior: Behavior normal.         Thought Content: Thought content normal.         Judgment: Judgment normal.         Assessment and Plan        Diagnoses and all orders for this visit:    1. Ulcerative pancolitis without complication (Primary)  -     CBC (No Diff)  -     Hepatic Function Panel  -     C-reactive Protein  -     TSH Rfx On Abnormal To Free T4  -     Vitamin D 25 Hydroxy  -     Vitamin B12  -     Discontinue: Ustekinumab (Stelara) 90 MG/ML solution prefilled syringe Injection; Inject 90 mg under the skin into the appropriate area as directed Every 2 (Two) Months.  Dispense: 1 each; Refill: 5  -     Ustekinumab (Stelara) 90 MG/ML solution prefilled syringe Injection; Inject 90 mg under the skin into the appropriate area as directed Every 2 (Two) Months.  Dispense: 1 each; Refill: 5    2. On ustekinumab therapy  -     CBC (No Diff)  -     Hepatic Function Panel  -     C-reactive Protein    3. Malaise and fatigue  -     TSH Rfx On Abnormal To Free T4  -     Vitamin D 25 Hydroxy  -     Vitamin B12       Assessment & Plan  1. Ulcerative Colitis diagnosed 2012  She experienced a delay in her Stelara administration due to insurance " changes, resulting in a 4-week delay. Despite the delay, she did not experience a flare-up.   She is advised to continue her Stelara regimen every 8 weeks.   A prescription for Stelara will be refilled through [facility] mail order.   Basic blood work, including CBC, liver function test, inflammation marker, TSH, and vitamin D, will be conducted today.   A colonoscopy is scheduled for June 2025.    2. Insomnia.  She has been using Trazodone to help with sleep.   There is no indication of a hormonal imbalance contributing to her sleep difficulties.   She is advised to continue with Trazodone as needed.    3. Medication Management.  She is currently on Pristiq 25 mg and is advised to discuss increasing the dose with her primary care provider.   She is off BuSpar and not taking Z-Mario or cough syrup.    4. Health Maintenance.  A referral to a gynecologist will be provided for her annual exam and IUD management. Basic blood work, including CBC, liver function test, inflammation marker, TSH, and vitamin D, will be conducted today.    Follow-up  Return in June 2025 for follow-up after colonoscopy.             Patient Instructions   Continue Stelara every 8 weeks, refill sent electronically to pharmacy    Proceed with blood work today for medication monitoring and health maintenance    Colonoscopy planned June 2025, recommend follow-up visit 2 to 4 weeks after colonoscopy for continued monitoring    Follow-up with GYN and primary care provider recommended    Follow-up after colonoscopy, sooner if symptoms change or condition warrants.      Patient or patient representative verbalized consent for the use of Ambient Listening during the visit with  JUANITA Wei for chart documentation. 11/17/2024  13:29 EST    EMR Dragon/Transcription Disclaimer:  This document has been Dictated utilizing Dragon dictation.

## 2024-11-17 NOTE — PATIENT INSTRUCTIONS
Continue Stelara every 8 weeks, refill sent electronically to pharmacy    Proceed with blood work today for medication monitoring and health maintenance    Colonoscopy planned June 2025, recommend follow-up visit 2 to 4 weeks after colonoscopy for continued monitoring    Follow-up with GYN and primary care provider recommended    Follow-up after colonoscopy, sooner if symptoms change or condition warrants.

## 2024-11-21 ENCOUNTER — SPECIALTY PHARMACY (OUTPATIENT)
Dept: GASTROENTEROLOGY | Facility: CLINIC | Age: 29
End: 2024-11-21
Payer: COMMERCIAL

## 2025-01-21 ENCOUNTER — TELEPHONE (OUTPATIENT)
Dept: GASTROENTEROLOGY | Facility: CLINIC | Age: 30
End: 2025-01-21
Payer: COMMERCIAL

## 2025-01-21 NOTE — TELEPHONE ENCOUNTER
Caller: NICK EDDY     Relationship:SELF     Callback number: [phone]   Is it ok to leave a message: [x] Yes [] No    Requested medication for samples: BETO     How much medication does the patient currently have left: OUT     Who will be picking up the samples: SELF     Do you need information about patient financial assistance for this medication: [] Yes [x] No    Additional details provided: HAS NOT HAD ANY MEDICATION SINCE OCTOBER

## 2025-01-21 NOTE — TELEPHONE ENCOUNTER
Discussed with patient.  She spoke to the insurance and they are waiting on the benefit investigation.  She will let me know if it doesn't get resolved. pk

## 2025-04-24 NOTE — PROGRESS NOTES
Well Woman Visit    CC: Scheduled annual well gyn visit  Chief Complaint   Patient presents with    Gynecologic Exam     HPI:   30 y.o.No obstetric history on file.   Social History     Substance and Sexual Activity   Sexual Activity Yes    Partners: Male    Birth control/protection: I.U.D.       Menses:   no cycle with Mirena  Pain with menses:  None      PCP: does manage PMHx and preventative labs  History: PMHx, Meds, Allergies, PSHx, Social Hx, and POBHx all reviewed and updated.    Pt has no complaints today.    PHYSICAL EXAM:  /75   Wt 69.9 kg (154 lb)   LMP  (LMP Unknown)   BMI 27.28 kg/m²  Not found.     Exam conducted with a chaperone present  General- NAD, alert and oriented, appropriate  Psych- Normal mood, good memory  Neck- No masses, no thyroid enlargement  CV- Regular rhythm, no murnurs  Resp- CTA to bases, no wheezes  Abdomen- Soft, non distended, non tender, no masses    Breast left-  Bilaterally symmetrical, no masses, non tender, no nipple discharge  Breast right- Bilaterally symmetrical, no masses, non tender, no nipple discharge    External genitalia- Normal female, no lesions  Urethra/meatus- Normal, no masses, non tender  Bladder- Normal, no masses, non tender  Vagina- Normal, no atrophy, no lesions, no discharge.  Prolapse : none noted   Cvx- Normal, no lesions, no discharge, No cervical motion tenderness, IUD strings visable 1cm  Uterus- Normal size, shape & consistency.  Non tender, mobile.  Adnexa- No mass, non tender  Anus/Rectum/Perineum- Not performed    Lymphatic- No palpable neck, axillary, or groin nodes  Ext- No edema, no cyanosis    Skin- No lesions, no rashes, no acanthosis nigricans      ASSESSMENT and PLAN:    Diagnoses and all orders for this visit:    1. Encounter for gynecological examination without abnormal finding (Primary)  -     IgP, Aptima HPV        Preventative:  BREAST HEALTH- Monthly self breast exam importance and how to reviewed. MMG and/or MRI (prn)  reviewed per society guidelines and her individual history. Screen: Not medically needed  CERVICAL CANCER Screening- Reviewed current ASCCP guidelines for screening w and wo cotest HPV, age specific.  Screen: Updated today  SEXUAL HEALTH: Declines STD screening  VACCINATIONS Recommended: Covid vaccine, Flu annually.  Importance discussed, risk being unvaccinated reviewed.  Questions answered  Smoking status- NON SMOKER/VAPER        She understands the importance of having any ordered tests to be performed in a timely fashion.  The risks of not performing them include, but are not limited to, advanced cancer stages, bone loss from osteoporosis and/or subsequent increase in morbidity and/or mortality.  She is encouraged to review her results online and/or contact or office if she has questions.     Follow Up:  Return in about 1 year (around 4/29/2026) for Annual physical.        Nikhil Valenzuela, APRN  04/29/2025    [facility]  [facility]  [address] [place] [address]  Dept: [phone]  Dept Fax: [phone]  Loc: [phone]

## 2025-04-29 ENCOUNTER — OFFICE VISIT (OUTPATIENT)
Dept: OBSTETRICS AND GYNECOLOGY | Age: 30
End: 2025-04-29
Payer: COMMERCIAL

## 2025-04-29 VITALS — WEIGHT: 154 LBS | DIASTOLIC BLOOD PRESSURE: 75 MMHG | SYSTOLIC BLOOD PRESSURE: 131 MMHG | BODY MASS INDEX: 27.28 KG/M2

## 2025-04-29 DIAGNOSIS — Z01.419 ENCOUNTER FOR GYNECOLOGICAL EXAMINATION WITHOUT ABNORMAL FINDING: Primary | ICD-10-CM

## 2025-04-29 PROCEDURE — G0123 SCREEN CERV/VAG THIN LAYER: HCPCS | Performed by: NURSE PRACTITIONER

## 2025-04-29 PROCEDURE — 87624 HPV HI-RISK TYP POOLED RSLT: CPT | Performed by: NURSE PRACTITIONER

## 2025-05-02 LAB
CYTOLOGIST CVX/VAG CYTO: NORMAL
CYTOLOGY CVX/VAG DOC CYTO: NORMAL
CYTOLOGY CVX/VAG DOC THIN PREP: NORMAL
DX ICD CODE: NORMAL
HPV I/H RISK 4 DNA CVX QL PROBE+SIG AMP: NEGATIVE
Lab: NORMAL
OTHER STN SPEC: NORMAL
SERVICE CMNT-IMP: NORMAL
STAT OF ADQ CVX/VAG CYTO-IMP: NORMAL

## 2025-06-02 ENCOUNTER — ANESTHESIA (OUTPATIENT)
Dept: SURGERY | Facility: SURGERY CENTER | Age: 30
End: 2025-06-02
Payer: COMMERCIAL

## 2025-06-02 ENCOUNTER — HOSPITAL ENCOUNTER (OUTPATIENT)
Facility: SURGERY CENTER | Age: 30
Setting detail: HOSPITAL OUTPATIENT SURGERY
Discharge: HOME | End: 2025-06-02
Attending: INTERNAL MEDICINE | Admitting: INTERNAL MEDICINE
Payer: COMMERCIAL

## 2025-06-02 ENCOUNTER — ANESTHESIA EVENT (OUTPATIENT)
Dept: SURGERY | Facility: SURGERY CENTER | Age: 30
End: 2025-06-02
Payer: COMMERCIAL

## 2025-06-02 VITALS
TEMPERATURE: 98 F | DIASTOLIC BLOOD PRESSURE: 49 MMHG | BODY MASS INDEX: 26.72 KG/M2 | RESPIRATION RATE: 16 BRPM | SYSTOLIC BLOOD PRESSURE: 89 MMHG | WEIGHT: 150.8 LBS | OXYGEN SATURATION: 99 % | HEART RATE: 74 BPM | HEIGHT: 63 IN

## 2025-06-02 DIAGNOSIS — Z79.620 ON USTEKINUMAB THERAPY: ICD-10-CM

## 2025-06-02 DIAGNOSIS — K51.00 ULCERATIVE PANCOLITIS WITHOUT COMPLICATION: ICD-10-CM

## 2025-06-02 LAB
B-HCG UR QL: NEGATIVE
EXPIRATION DATE: ABNORMAL
INTERNAL NEGATIVE CONTROL: NEGATIVE
INTERNAL POSITIVE CONTROL: POSITIVE
Lab: ABNORMAL

## 2025-06-02 PROCEDURE — 25010000002 PROPOFOL 1000 MG/100ML EMULSION: Performed by: NURSE ANESTHETIST, CERTIFIED REGISTERED

## 2025-06-02 PROCEDURE — 88305 TISSUE EXAM BY PATHOLOGIST: CPT | Performed by: INTERNAL MEDICINE

## 2025-06-02 PROCEDURE — 25010000002 LIDOCAINE 2% SOLUTION: Performed by: NURSE ANESTHETIST, CERTIFIED REGISTERED

## 2025-06-02 PROCEDURE — 81025 URINE PREGNANCY TEST: CPT | Performed by: INTERNAL MEDICINE

## 2025-06-02 PROCEDURE — 45380 COLONOSCOPY AND BIOPSY: CPT | Performed by: INTERNAL MEDICINE

## 2025-06-02 PROCEDURE — 25010000002 PROPOFOL 10 MG/ML EMULSION: Performed by: NURSE ANESTHETIST, CERTIFIED REGISTERED

## 2025-06-02 PROCEDURE — 25810000003 LACTATED RINGERS PER 1000 ML: Performed by: INTERNAL MEDICINE

## 2025-06-02 RX ORDER — SODIUM CHLORIDE, SODIUM LACTATE, POTASSIUM CHLORIDE, CALCIUM CHLORIDE 600; 310; 30; 20 MG/100ML; MG/100ML; MG/100ML; MG/100ML
1000 INJECTION, SOLUTION INTRAVENOUS CONTINUOUS
Status: DISCONTINUED | OUTPATIENT
Start: 2025-06-02 | End: 2025-06-02 | Stop reason: HOSPADM

## 2025-06-02 RX ORDER — LIDOCAINE HYDROCHLORIDE 20 MG/ML
INJECTION, SOLUTION INFILTRATION; PERINEURAL AS NEEDED
Status: DISCONTINUED | OUTPATIENT
Start: 2025-06-02 | End: 2025-06-02 | Stop reason: SURG

## 2025-06-02 RX ORDER — SODIUM CHLORIDE 0.9 % (FLUSH) 0.9 %
10 SYRINGE (ML) INJECTION AS NEEDED
Status: DISCONTINUED | OUTPATIENT
Start: 2025-06-02 | End: 2025-06-02 | Stop reason: HOSPADM

## 2025-06-02 RX ORDER — PROPOFOL 10 MG/ML
VIAL (ML) INTRAVENOUS AS NEEDED
Status: DISCONTINUED | OUTPATIENT
Start: 2025-06-02 | End: 2025-06-02 | Stop reason: SURG

## 2025-06-02 RX ORDER — LIDOCAINE HYDROCHLORIDE 10 MG/ML
0.5 INJECTION, SOLUTION INFILTRATION; PERINEURAL ONCE AS NEEDED
Status: DISCONTINUED | OUTPATIENT
Start: 2025-06-02 | End: 2025-06-02 | Stop reason: HOSPADM

## 2025-06-02 RX ORDER — PROPOFOL 10 MG/ML
INJECTION, EMULSION INTRAVENOUS CONTINUOUS PRN
Status: DISCONTINUED | OUTPATIENT
Start: 2025-06-02 | End: 2025-06-02 | Stop reason: SURG

## 2025-06-02 RX ADMIN — LIDOCAINE HYDROCHLORIDE 50 MG: 20 INJECTION, SOLUTION INFILTRATION; PERINEURAL at 07:29

## 2025-06-02 RX ADMIN — SODIUM CHLORIDE, POTASSIUM CHLORIDE, SODIUM LACTATE AND CALCIUM CHLORIDE 1000 ML: 600; 310; 30; 20 INJECTION, SOLUTION INTRAVENOUS at 06:36

## 2025-06-02 RX ADMIN — PROPOFOL 250 MCG/KG/MIN: 10 INJECTION, EMULSION INTRAVENOUS at 07:30

## 2025-06-02 RX ADMIN — PROPOFOL 130 MG: 10 INJECTION, EMULSION INTRAVENOUS at 07:29

## 2025-06-02 NOTE — ANESTHESIA PREPROCEDURE EVALUATION
Anesthesia Evaluation     Patient summary reviewed and Nursing notes reviewed   NPO Solid Status: > 6 hours  NPO Liquid Status: > 2 hours           Airway   Mallampati: II  TM distance: >3 FB  Neck ROM: full  Anterior  Dental - normal exam     Pulmonary    (-) COPD, asthma, not a smoker  Cardiovascular   Exercise tolerance: good (4-7 METS)    (-) past MI, dysrhythmias, angina      Neuro/Psych  (+) seizures (none recent. no longer requires AED) well controlled, psychiatric history Anxiety and Depression  (-) CVA  GI/Hepatic/Renal/Endo    (+) GERD well controlled  (-) liver disease, no renal disease, diabetes, no thyroid disorder    Musculoskeletal     Abdominal    Substance History      OB/GYN          Other                    Anesthesia Plan    ASA 2     MAC       Anesthetic plan, risks, benefits, and alternatives have been provided, discussed and informed consent has been obtained with: patient.    CODE STATUS:

## 2025-06-02 NOTE — ANESTHESIA POSTPROCEDURE EVALUATION
Patient: Rajeev Mendoza    Procedure Summary       Date: 06/02/25 Room / Location: [facility]  [facility]    Anesthesia Start: 0725 Anesthesia Stop: 0744    Procedure: COLONOSCOPY FOR SCREENING Diagnosis:       Ulcerative pancolitis without complication      On ustekinumab therapy      (Ulcerative pancolitis without complication [K51.00])      (On ustekinumab therapy [Z79.899])    Surgeons: Ivan Chowdhury MD Provider: Miguel Hardy MD    Anesthesia Type: MAC ASA Status: 2            Anesthesia Type: MAC    Vitals  Vitals Value Taken Time   /71 06/02/25 08:16   Temp 36.7 °C (98 °F) 06/02/25 07:43   Pulse 75 06/02/25 08:07   Resp 16 06/02/25 07:52   SpO2 100 % 06/02/25 08:07   Vitals shown include unfiled device data.        Post Anesthesia Care and Evaluation    Patient location during evaluation: PHASE II  Patient participation: complete - patient participated  Level of consciousness: awake  Pain management: satisfactory to patient    Airway patency: patent  Anesthetic complications: No anesthetic complications  PONV Status: controlled  Cardiovascular status: acceptable  Respiratory status: acceptable  Hydration status: acceptable     Pt in XR at this time

## 2025-06-02 NOTE — H&P
No chief complaint on file.      HPI  UC         Problem List:    Patient Active Problem List   Diagnosis    Blood per rectum    Hypokalemia    Iron deficiency    Inflammatory bowel disease    Anxiety and depression    Generalized abdominal pain    Ulcerative colitis    Anemia    Bowel disease    Epilepsy    Hemorrhoids    Limb swelling    Night sweats    Seizures    Rectal bleeding    On ustekinumab therapy       Medical History:    Past Medical History:   Diagnosis Date    Anemia     Anxiety and depression     Colon polyp     GERD (gastroesophageal reflux disease)     rare    GI (gastrointestinal bleed)     Irritable bowel syndrome     Seizures     childhood no longer a problem, resolved    Ulcerative colitis         Social History:    Social History     Socioeconomic History    Marital status:    Tobacco Use    Smoking status: Former     Current packs/day: 0.00     Types: Cigarettes     Quit date:      Years since quittin.4     Passive exposure: Past    Smokeless tobacco: Never   Vaping Use    Vaping status: Never Used   Substance and Sexual Activity    Alcohol use: Not Currently     Alcohol/week: 0.0 standard drinks of alcohol     Comment: Socially    Drug use: Never    Sexual activity: Yes     Partners: Male     Birth control/protection: I.U.D.       Family History:   Family History   Problem Relation Age of Onset    Fibromyalgia Mother     Seizures Mother     Diabetes Mother     Hyperlipidemia Mother     Irritable bowel syndrome Mother     Pancreatitis Mother     Ulcerative colitis Paternal Uncle     Colon cancer Neg Hx     Colon polyps Neg Hx     Crohn's disease Neg Hx        Surgical History:   Past Surgical History:   Procedure Laterality Date     SECTION      COLONOSCOPY N/A 2021    Procedure: COLONOSCOPY TO CECUM WITH COLD BIOPSIES, POLYPECTOMIES;  Surgeon: Joaquin Ko MD;  Location: [facility] ENDOSCOPY;  Service: Gastroenterology;  Laterality: N/A;   PRE-RECTAL BLEEDING  POST- COLITIS, POLYPS    COLONOSCOPY N/A 6/9/2023    Procedure: COLONOSCOPY;  Surgeon: Ivan Chowdhury MD;  [place] [facility] [place] [facility];  Service: Gastroenterology;  Laterality: N/A;  quiescent colon, polyps    KNEE SURGERY Left          Current Facility-Administered Medications:     lactated ringers infusion 1,000 mL, 1,000 mL, Intravenous, Continuous, Ivan Chowdhury MD, Last Rate: 25 mL/hr at 06/02/25 0636, 1,000 mL at 06/02/25 0636    lidocaine (XYLOCAINE) 1 % injection 0.5 mL, 0.5 mL, Intradermal, Once PRN, Ivan Chowdhury MD    sodium chloride 0.9 % flush 10 mL, 10 mL, Intravenous, PRN, Ivan Chowdhury MD    Allergies: No Known Allergies     The following portions of the patient's history were reviewed by me and updated as appropriate: review of systems, allergies, current medications, past family history, past medical history, past social history, past surgical history and problem list.    Vitals:    06/02/25 0628   BP: 104/76   Pulse: 74   Resp: 16   Temp: 97 °F (36.1 °C)   SpO2: 98%       PHYSICAL EXAM:    CONSTITUTIONAL:  today's vital signs reviewed by me  GASTROINTESTINAL: abdomen is soft nontender nondistended with normal active bowel sounds, no masses are appreciated    Assessment/ Plan  UC    colonoscopy    Risks and benefits as well as alternatives to endoscopic evaluation were explained to the patient and they voiced understanding and wish to proceed.  These risks include but are not limited to the risk of bleeding, perforation, adverse reaction to sedation, and missed lesions.  The patient was given the opportunity to ask questions prior to the endoscopic procedure.

## 2025-06-03 DIAGNOSIS — K51.00 ULCERATIVE PANCOLITIS WITHOUT COMPLICATION: ICD-10-CM

## 2025-06-03 RX ORDER — USTEKINUMAB 90 MG/ML
90 INJECTION, SOLUTION SUBCUTANEOUS
Qty: 1 EACH | Refills: 3 | Status: SHIPPED | OUTPATIENT
Start: 2025-06-03

## 2025-06-04 LAB
CYTO UR: NORMAL
LAB AP CASE REPORT: NORMAL
PATH REPORT.FINAL DX SPEC: NORMAL
PATH REPORT.GROSS SPEC: NORMAL

## 2025-07-29 ENCOUNTER — SPECIALTY PHARMACY (OUTPATIENT)
Dept: GASTROENTEROLOGY | Facility: CLINIC | Age: 30
End: 2025-07-29
Payer: COMMERCIAL

## 2025-07-29 NOTE — PROGRESS NOTES
Josy MORGAN approved  [id number]  [facility]  [phone]     Mary Guzmán  7/29/2025  13:25 EDT

## (undated) DEVICE — KT ORCA ORCAPOD DISP STRL

## (undated) DEVICE — GOWN PROC ENDOARMOR GI LVL3 HY/SHLD UNIV

## (undated) DEVICE — SINGLE-USE BIOPSY FORCEPS: Brand: RADIAL JAW 4

## (undated) DEVICE — JACKT LAB F/R KNIT CUFF/COLR XLG BLU

## (undated) DEVICE — TUBING, SUCTION, 1/4" X 10', STRAIGHT: Brand: MEDLINE

## (undated) DEVICE — ADAPT CLN SCPE ENDO PORPOISE BX/50 DISP

## (undated) DEVICE — Device

## (undated) DEVICE — VIAL FORMLN CAP 10PCT 20ML

## (undated) DEVICE — LN SMPL CO2 SHTRM SD STREAM W/M LUER

## (undated) DEVICE — CANN O2 ETCO2 FITS ALL CONN CO2 SMPL A/ 7IN DISP LF

## (undated) DEVICE — FLEX ADVANTAGE 1500CC: Brand: FLEX ADVANTAGE

## (undated) DEVICE — SYRINGE, LUER SLIP, STERILE, 60ML: Brand: MEDLINE

## (undated) DEVICE — ADAPT CLN BIOGUARD AIR/H2O DISP

## (undated) DEVICE — GOWN ISOL W/THUMB UNIV BLU BX/15

## (undated) DEVICE — SENSR O2 OXIMAX FNGR A/ 18IN NONSTR

## (undated) DEVICE — CANN NASL CO2 TRULINK W/O2 A/

## (undated) DEVICE — THE TORRENT IRRIGATION SCOPE CONNECTOR IS USED WITH THE TORRENT IRRIGATION TUBING TO PROVIDE IRRIGATION FLUIDS SUCH AS STERILE WATER DURING GASTROINTESTINAL ENDOSCOPIC PROCEDURES WHEN USED IN CONJUNCTION WITH AN IRRIGATION PUMP (OR ELECTROSURGICAL UNIT).: Brand: TORRENT